# Patient Record
Sex: FEMALE | Race: WHITE | NOT HISPANIC OR LATINO | Employment: UNEMPLOYED | URBAN - METROPOLITAN AREA
[De-identification: names, ages, dates, MRNs, and addresses within clinical notes are randomized per-mention and may not be internally consistent; named-entity substitution may affect disease eponyms.]

---

## 2017-08-17 ENCOUNTER — HOSPITAL ENCOUNTER (EMERGENCY)
Facility: HOSPITAL | Age: 52
Discharge: HOME | End: 2017-08-17
Attending: EMERGENCY MEDICINE
Payer: MEDICAID

## 2017-08-17 VITALS
WEIGHT: 159 LBS | RESPIRATION RATE: 18 BRPM | DIASTOLIC BLOOD PRESSURE: 85 MMHG | BODY MASS INDEX: 25.55 KG/M2 | HEART RATE: 91 BPM | OXYGEN SATURATION: 98 % | HEIGHT: 66 IN | TEMPERATURE: 99 F | SYSTOLIC BLOOD PRESSURE: 131 MMHG

## 2017-08-17 DIAGNOSIS — R07.89 CHEST TIGHTNESS: Primary | ICD-10-CM

## 2017-08-17 DIAGNOSIS — S16.1XXA NECK STRAIN, INITIAL ENCOUNTER: ICD-10-CM
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
## 2017-08-17 PROCEDURE — 99284 EMERGENCY DEPT VISIT MOD MDM: CPT

## 2017-08-17 PROCEDURE — 93010 ELECTROCARDIOGRAM REPORT: CPT | Mod: ,,, | Performed by: INTERNAL MEDICINE

## 2017-08-17 PROCEDURE — 93005 ELECTROCARDIOGRAM TRACING: CPT

## 2017-08-17 PROCEDURE — 25000003 PHARM REV CODE 250: Performed by: PHYSICIAN ASSISTANT

## 2017-08-17 RX ORDER — METHOCARBAMOL 500 MG/1
1000 TABLET, FILM COATED ORAL 3 TIMES DAILY
Qty: 12 TABLET | Refills: 0 | Status: SHIPPED | OUTPATIENT
Start: 2017-08-17 | End: 2017-08-19

## 2017-08-17 RX ORDER — ACETAMINOPHEN 325 MG/1
325 TABLET ORAL EVERY 6 HOURS PRN
Qty: 20 TABLET | Refills: 0 | Status: SHIPPED | OUTPATIENT
Start: 2017-08-17

## 2017-08-17 RX ORDER — LIDOCAINE 50 MG/G
1 PATCH TOPICAL DAILY
Qty: 6 PATCH | Refills: 0 | Status: SHIPPED | OUTPATIENT
Start: 2017-08-17

## 2017-08-17 RX ORDER — DIAZEPAM 2 MG/1
2 TABLET ORAL
Status: COMPLETED | OUTPATIENT
Start: 2017-08-17 | End: 2017-08-17

## 2017-08-17 RX ADMIN — DIAZEPAM 2 MG: 2 TABLET ORAL at 04:08
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
## 2024-07-24 ENCOUNTER — HOSPITAL ENCOUNTER (EMERGENCY)
Facility: HOSPITAL | Age: 59
Discharge: HOME | End: 2024-07-24
Attending: EMERGENCY MEDICINE
Payer: MEDICAID

## 2024-07-24 VITALS
BODY MASS INDEX: 25.33 KG/M2 | DIASTOLIC BLOOD PRESSURE: 66 MMHG | SYSTOLIC BLOOD PRESSURE: 122 MMHG | HEIGHT: 65 IN | RESPIRATION RATE: 18 BRPM | OXYGEN SATURATION: 95 % | HEART RATE: 56 BPM | WEIGHT: 152 LBS | TEMPERATURE: 99 F

## 2024-07-24 DIAGNOSIS — R07.9 CHEST PAIN: ICD-10-CM

## 2024-07-24 DIAGNOSIS — R07.89 CHEST WALL PAIN: Primary | ICD-10-CM

## 2024-07-24 LAB
ALBUMIN SERPL BCP-MCNC: 4.1 G/DL (ref 3.5–5.2)
ALP SERPL-CCNC: 148 U/L (ref 55–135)
ALT SERPL W/O P-5'-P-CCNC: 19 U/L (ref 10–44)
ANION GAP SERPL CALC-SCNC: 11 MMOL/L (ref 8–16)
AST SERPL-CCNC: 19 U/L (ref 10–40)
BASOPHILS # BLD AUTO: 0.03 K/UL (ref 0–0.2)
BASOPHILS NFR BLD: 0.3 % (ref 0–1.9)
BILIRUB SERPL-MCNC: 0.5 MG/DL (ref 0.1–1)
BNP SERPL-MCNC: 87 PG/ML (ref 0–99)
BUN SERPL-MCNC: 11 MG/DL (ref 6–20)
CALCIUM SERPL-MCNC: 10 MG/DL (ref 8.7–10.5)
CHLORIDE SERPL-SCNC: 106 MMOL/L (ref 95–110)
CO2 SERPL-SCNC: 24 MMOL/L (ref 23–29)
CREAT SERPL-MCNC: 0.9 MG/DL (ref 0.5–1.4)
D DIMER PPP IA.FEU-MCNC: 1.01 MG/L FEU
DIFFERENTIAL METHOD BLD: ABNORMAL
EOSINOPHIL # BLD AUTO: 0.2 K/UL (ref 0–0.5)
EOSINOPHIL NFR BLD: 1.7 % (ref 0–8)
ERYTHROCYTE [DISTWIDTH] IN BLOOD BY AUTOMATED COUNT: 13.5 % (ref 11.5–14.5)
EST. GFR  (NO RACE VARIABLE): >60 ML/MIN/1.73 M^2
GLUCOSE SERPL-MCNC: 116 MG/DL (ref 70–110)
HCT VFR BLD AUTO: 44.4 % (ref 37–48.5)
HGB BLD-MCNC: 15.6 G/DL (ref 12–16)
IMM GRANULOCYTES # BLD AUTO: 0.04 K/UL (ref 0–0.04)
IMM GRANULOCYTES NFR BLD AUTO: 0.4 % (ref 0–0.5)
LIPASE SERPL-CCNC: 26 U/L (ref 4–60)
LYMPHOCYTES # BLD AUTO: 2.3 K/UL (ref 1–4.8)
LYMPHOCYTES NFR BLD: 23.9 % (ref 18–48)
MCH RBC QN AUTO: 31.8 PG (ref 27–31)
MCHC RBC AUTO-ENTMCNC: 35.1 G/DL (ref 32–36)
MCV RBC AUTO: 90 FL (ref 82–98)
MONOCYTES # BLD AUTO: 0.6 K/UL (ref 0.3–1)
MONOCYTES NFR BLD: 5.8 % (ref 4–15)
NEUTROPHILS # BLD AUTO: 6.6 K/UL (ref 1.8–7.7)
NEUTROPHILS NFR BLD: 67.9 % (ref 38–73)
NRBC BLD-RTO: 0 /100 WBC
OHS QRS DURATION: 84 MS
OHS QTC CALCULATION: 496 MS
PLATELET # BLD AUTO: 172 K/UL (ref 150–450)
PMV BLD AUTO: 10 FL (ref 9.2–12.9)
POTASSIUM SERPL-SCNC: 4.4 MMOL/L (ref 3.5–5.1)
PROT SERPL-MCNC: 7.9 G/DL (ref 6–8.4)
RBC # BLD AUTO: 4.91 M/UL (ref 4–5.4)
SODIUM SERPL-SCNC: 141 MMOL/L (ref 136–145)
TROPONIN I SERPL DL<=0.01 NG/ML-MCNC: <0.006 NG/ML (ref 0–0.03)
TROPONIN I SERPL DL<=0.01 NG/ML-MCNC: <0.006 NG/ML (ref 0–0.03)
WBC # BLD AUTO: 9.78 K/UL (ref 3.9–12.7)

## 2024-07-24 PROCEDURE — 93005 ELECTROCARDIOGRAM TRACING: CPT

## 2024-07-24 PROCEDURE — 85379 FIBRIN DEGRADATION QUANT: CPT | Performed by: EMERGENCY MEDICINE

## 2024-07-24 PROCEDURE — 96374 THER/PROPH/DIAG INJ IV PUSH: CPT | Mod: 59

## 2024-07-24 PROCEDURE — 25500020 PHARM REV CODE 255: Performed by: EMERGENCY MEDICINE

## 2024-07-24 PROCEDURE — 84484 ASSAY OF TROPONIN QUANT: CPT | Mod: 91 | Performed by: EMERGENCY MEDICINE

## 2024-07-24 PROCEDURE — 80053 COMPREHEN METABOLIC PANEL: CPT | Performed by: EMERGENCY MEDICINE

## 2024-07-24 PROCEDURE — 63600175 PHARM REV CODE 636 W HCPCS: Performed by: EMERGENCY MEDICINE

## 2024-07-24 PROCEDURE — 96376 TX/PRO/DX INJ SAME DRUG ADON: CPT

## 2024-07-24 PROCEDURE — 83690 ASSAY OF LIPASE: CPT | Performed by: EMERGENCY MEDICINE

## 2024-07-24 PROCEDURE — 96375 TX/PRO/DX INJ NEW DRUG ADDON: CPT | Mod: 59

## 2024-07-24 PROCEDURE — 93010 ELECTROCARDIOGRAM REPORT: CPT | Mod: ,,, | Performed by: INTERNAL MEDICINE

## 2024-07-24 PROCEDURE — 83880 ASSAY OF NATRIURETIC PEPTIDE: CPT | Performed by: EMERGENCY MEDICINE

## 2024-07-24 PROCEDURE — 25000003 PHARM REV CODE 250: Performed by: EMERGENCY MEDICINE

## 2024-07-24 PROCEDURE — 85025 COMPLETE CBC W/AUTO DIFF WBC: CPT | Performed by: EMERGENCY MEDICINE

## 2024-07-24 PROCEDURE — 99285 EMERGENCY DEPT VISIT HI MDM: CPT | Mod: 25

## 2024-07-24 RX ORDER — LIDOCAINE 50 MG/G
1 PATCH TOPICAL ONCE
Status: DISCONTINUED | OUTPATIENT
Start: 2024-07-24 | End: 2024-07-24 | Stop reason: HOSPADM

## 2024-07-24 RX ORDER — KETOROLAC TROMETHAMINE 30 MG/ML
15 INJECTION, SOLUTION INTRAMUSCULAR; INTRAVENOUS
Status: COMPLETED | OUTPATIENT
Start: 2024-07-24 | End: 2024-07-24

## 2024-07-24 RX ORDER — METHOCARBAMOL 500 MG/1
1000 TABLET, FILM COATED ORAL 3 TIMES DAILY
Qty: 30 TABLET | Refills: 0 | Status: SHIPPED | OUTPATIENT
Start: 2024-07-24 | End: 2024-07-29

## 2024-07-24 RX ORDER — MORPHINE SULFATE 4 MG/ML
4 INJECTION, SOLUTION INTRAMUSCULAR; INTRAVENOUS
Status: COMPLETED | OUTPATIENT
Start: 2024-07-24 | End: 2024-07-24

## 2024-07-24 RX ORDER — KETOROLAC TROMETHAMINE 10 MG/1
10 TABLET, FILM COATED ORAL EVERY 6 HOURS
Qty: 20 TABLET | Refills: 0 | Status: SHIPPED | OUTPATIENT
Start: 2024-07-24 | End: 2024-07-29

## 2024-07-24 RX ORDER — ONDANSETRON HYDROCHLORIDE 2 MG/ML
4 INJECTION, SOLUTION INTRAVENOUS
Status: COMPLETED | OUTPATIENT
Start: 2024-07-24 | End: 2024-07-24

## 2024-07-24 RX ORDER — LIDOCAINE 50 MG/G
1 PATCH TOPICAL DAILY
Qty: 15 PATCH | Refills: 0 | Status: SHIPPED | OUTPATIENT
Start: 2024-07-24

## 2024-07-24 RX ADMIN — ONDANSETRON 4 MG: 2 INJECTION INTRAMUSCULAR; INTRAVENOUS at 08:07

## 2024-07-24 RX ADMIN — LIDOCAINE 1 PATCH: 700 PATCH TOPICAL at 02:07

## 2024-07-24 RX ADMIN — KETOROLAC TROMETHAMINE 15 MG: 30 INJECTION, SOLUTION INTRAMUSCULAR at 02:07

## 2024-07-24 RX ADMIN — MORPHINE SULFATE 4 MG: 4 INJECTION, SOLUTION INTRAMUSCULAR; INTRAVENOUS at 11:07

## 2024-07-24 RX ADMIN — MORPHINE SULFATE 4 MG: 4 INJECTION, SOLUTION INTRAMUSCULAR; INTRAVENOUS at 08:07

## 2024-07-24 RX ADMIN — IOHEXOL 75 ML: 350 INJECTION, SOLUTION INTRAVENOUS at 12:07

## 2024-07-24 NOTE — ED NOTES
Pt presented w/ c/o pain under her right breast x 1 wk, worse w/ breathing and movement.  ED work up in progress.  Pt is AAOx3, resp even and unlabored, skin warm and dry.  HOB elevated, SR up x 2, call bell within reach. Chest pain now 6/10, denies nausea. NAD noted. Awaiting CTA.

## 2024-07-24 NOTE — ED PROVIDER NOTES
"Encounter Date: 7/24/2024    SCRIBE #1 NOTE: I, Lyn Pace, am scribing for, and in the presence of,  Joni Link MD. I have scribed the following portions of the note - Other sections scribed: HPI, ROS.       History     Chief Complaint   Patient presents with    Chest Pain     Pt reports pain to right chest and right "ribs" when taking a deep breath x1 week. Pt denies shortness of breath, cough, congestion, or fevers.      59 y.o. female, with a PMHx of HTN, emphysema, who presents to the ED with right lower chest wall pain for 1 week. Patient reports the pain is worse with breathing and movement, stating it wraps around her left flank into her back. Denies any known falls or trauma. No other exacerbating or alleviating factors.     The history is provided by the patient. No  was used.     Review of patient's allergies indicates:   Allergen Reactions    Penicillins Swelling     Past Medical History:   Diagnosis Date    Anxiety     Bipolar 1 disorder with moderate cassie     Depression     Hypertension      Past Surgical History:   Procedure Laterality Date    HYSTERECTOMY      KNEE SURGERY       No family history on file.  Social History     Tobacco Use    Smoking status: Former     Current packs/day: 1.50     Average packs/day: 1.5 packs/day for 8.0 years (12.0 ttl pk-yrs)     Types: Cigarettes    Smokeless tobacco: Never   Substance Use Topics    Alcohol use: No     Alcohol/week: 0.0 standard drinks of alcohol    Drug use: No     Review of Systems   Constitutional: Negative.    HENT: Negative.     Eyes: Negative.    Respiratory: Negative.     Cardiovascular:  Positive for chest pain (right chest wall).   Gastrointestinal: Negative.    Endocrine: Negative.    Genitourinary:  Positive for flank pain (R).   Musculoskeletal:  Positive for back pain.   Skin: Negative.    Allergic/Immunologic: Negative.    Neurological: Negative.    Hematological: Negative.        Physical Exam "     Initial Vitals [07/24/24 0724]   BP Pulse Resp Temp SpO2   (!) 143/63 63 18 97.3 °F (36.3 °C) 97 %      MAP       --         Physical Exam    Nursing note and vitals reviewed.  Constitutional: She appears well-developed. She is not diaphoretic. She appears distressed (mildly).   HENT:   Head: Normocephalic and atraumatic.   Nose: Nose normal.   Eyes: EOM are normal. Pupils are equal, round, and reactive to light.   Neck: Neck supple. No JVD present.   Normal range of motion.  Cardiovascular:  Normal rate, regular rhythm, normal heart sounds and intact distal pulses.           Pulmonary/Chest: Breath sounds normal. No stridor. No respiratory distress. She has no wheezes. She has no rhonchi. She has no rales. She exhibits tenderness (right-sided chest wall tenderness, no overlying skin changes noted).   Abdominal: Abdomen is soft. Bowel sounds are normal. She exhibits no distension. There is abdominal tenderness (mild RUQ ttp). There is no rebound and no guarding.   Musculoskeletal:         General: No tenderness or edema. Normal range of motion.      Cervical back: Normal range of motion and neck supple.     Neurological: She is alert and oriented to person, place, and time. She has normal strength.   Skin: Skin is warm and dry. Capillary refill takes less than 2 seconds. No rash noted. No erythema.         ED Course   Procedures  Labs Reviewed   CBC W/ AUTO DIFFERENTIAL - Abnormal       Result Value    WBC 9.78      RBC 4.91      Hemoglobin 15.6      Hematocrit 44.4      MCV 90      MCH 31.8 (*)     MCHC 35.1      RDW 13.5      Platelets 172      MPV 10.0      Immature Granulocytes 0.4      Gran # (ANC) 6.6      Immature Grans (Abs) 0.04      Lymph # 2.3      Mono # 0.6      Eos # 0.2      Baso # 0.03      nRBC 0      Gran % 67.9      Lymph % 23.9      Mono % 5.8      Eosinophil % 1.7      Basophil % 0.3      Differential Method Automated     COMPREHENSIVE METABOLIC PANEL - Abnormal    Sodium 141      Potassium  4.4      Chloride 106      CO2 24      Glucose 116 (*)     BUN 11      Creatinine 0.9      Calcium 10.0      Total Protein 7.9      Albumin 4.1      Total Bilirubin 0.5      Alkaline Phosphatase 148 (*)     AST 19      ALT 19      eGFR >60      Anion Gap 11     D DIMER, QUANTITATIVE - Abnormal    D-Dimer 1.01 (*)    TROPONIN I    Troponin I <0.006     B-TYPE NATRIURETIC PEPTIDE    BNP 87     LIPASE   LIPASE    Lipase 26     TROPONIN I    Troponin I <0.006          ECG Results              EKG 12-lead (Final result)        Collection Time Result Time QRS Duration OHS QTC Calculation    07/24/24 07:33:26 07/24/24 18:16:08 84 496                     Final result by Interface, Lab In [facility] (07/24/24 18:16:18)                   Narrative:    Test Reason : R07.9,    Vent. Rate : 091 BPM     Atrial Rate : 091 BPM     P-R Int : 150 ms          QRS Dur : 084 ms      QT Int : 404 ms       P-R-T Axes : 062 019 063 degrees     QTc Int : 496 ms    Sinus rhythm with frequent Premature ventricular complexes in a pattern of  bigeminy  Low voltage QRS  Borderline Abnormal ECG  When compared with ECG of 17-AUG-2017 16:47,  No significant change was found  Confirmed by Lloyd ÁLVAREZ, Arthur BERNARDO ([id number]) on 7/24/2024 6:16:04 PM    Referred By: [email or web address]   SELF           Confirmed By:Arthur Desai MD                                  Imaging Results              CTA Chest Non-Coronary (PE Studies) (Final result)  Result time 07/24/24 13:32:55      Final result by Antonino Musa MD (07/24/24 13:32:55)                   Impression:      1. Examination considered diagnostic to the proximal segmental pulmonary arterial level without convincing evidence of acute pulmonary embolism.  2. No definite acute intrathoracic findings.  3. Nonspecific solid pulmonary nodules measuring below 6 mm.  For multiple solid nodules all <6 mm, Fleischner Society 2017 guidelines recommend no routine follow up for a low risk patient, or follow up with non-contrast  chest CT at 12 months after discovery in a high risk patient.  4. Additional details, as provided in the body of the report.      Electronically signed by: Antonino Musa  Date:    07/24/2024  Time:    13:32               Narrative:    EXAMINATION:  CTA CHEST NON CORONARY (PE STUDIES)    CLINICAL HISTORY:  Pulmonary embolism (PE) suspected, positive D-dimer;    TECHNIQUE:  Low dose axial images, sagittal and coronal reformations were obtained from the thoracic inlet to the lung bases following the IV administration of 75 mL of Omnipaque 350.  Contrast timing was optimized to evaluate the pulmonary arteries.  MIP images were performed.    COMPARISON:  Chest radiograph 07/24/2024.    FINDINGS:  Exam quality: Examination considered diagnostic to the proximal segmental pulmonary arterial level.    Pulmonary embolism: No acute or chronic pulmonary embolism.    Central pulmonary arteries: Normal caliber.    Aorta: Nonaneurysmal.  Left-sided arch.  Conventional 3 vessel arch anatomy.  Moderate atherosclerotic calcification.    Heart: No right heart strain. Normal size.    Coronary arteries: No calcifications.    Pericardium: Normal. No effusion, thickening, or calcification.    Support tubes and lines: None.    Base of neck/thyroid: Normal.    Lymph nodes: No supraclavicular, axillary, internal mammary, mediastinal, or hilar adenopathy.    Esophagus: Normal.    Pleura: No effusion, thickening, or calcification.    Upper abdomen: Unremarkable    Body wall: Unremarkable    Airways: Central airways grossly patent.    Lungs: Assessment limited by motion.  Centrilobular predominant emphysema.  Dependent atelectasis.  Scattered areas of scarring.  Irregular shaped solid nodule measuring the order 5 mm in the right middle lobe abutting the fissure, possibly lymph node.  4 mm solid nodule right lower lobe (series 3, image 241).    Bones: No acute abnormality.  Partially imaged sequela of ACDF.  Modest degenerative change in the  thoracic spine.                                       US Abdomen Limited (Final result)  Result time 07/24/24 10:07:12      Final result by Jone Amador MD (07/24/24 10:07:12)                   Impression:      No sonographic evidence of cholelithiasis or acute cholecystitis.    Limited evaluation of the liver suggests hepatic steatosis.      Electronically signed by: Jone Amador MD  Date:    07/24/2024  Time:    10:07               Narrative:    EXAMINATION:  US ABDOMEN LIMITED    CLINICAL HISTORY:  RUQ abdominal pain;    TECHNIQUE:  Limited ultrasound of the right upper quadrant of the abdomen focused on the biliary system was performed.    COMPARISON:  None    FINDINGS:  Gallbladder: No calculi, wall thickening, or pericholecystic fluid.  No sonographic Jamison's sign.    Biliary system: The common duct is not dilated, measuring 3 mm.  No intrahepatic ductal dilatation.    Pancreas: The visualized portions of pancreas appear normal.    Miscellaneous: No upper abdominal ascites.  Limited evaluation of the hepatic parenchyma suggests steatosis.                                       X-Ray Chest AP Portable (Final result)  Result time 07/24/24 09:02:57      Final result by Antonino Musa MD (07/24/24 09:02:57)                   Impression:      Nonspecific left basilar opacity could relate to atelectasis, aspiration, or pneumonia.      Electronically signed by: Antonino Musa  Date:    07/24/2024  Time:    09:02               Narrative:    EXAMINATION:  XR CHEST AP PORTABLE    CLINICAL HISTORY:  Chest Pain;    TECHNIQUE:  Single frontal view of the chest was performed.    COMPARISON:  Chest radiograph performed 08/17/2017.    FINDINGS:  Monitoring leads overlie the chest.  Cardiomediastinal contours appear grossly unchanged within normal limits.  Atherosclerosis of the aorta.  Nonspecific left basilar opacity.    No definite pneumothorax or large volume pleural effusion.    No acute findings in the  visualized abdomen.    Osseous and soft tissue structures appear without definite acute abnormality.                                       Medications   morphine injection 4 mg (4 mg Intravenous Given 7/24/24 0834)   ondansetron injection 4 mg (4 mg Intravenous Given 7/24/24 0833)   morphine injection 4 mg (4 mg Intravenous Given 7/24/24 1119)   iohexoL (OMNIPAQUE 350) injection 75 mL (75 mLs Intravenous Given 7/24/24 1257)   ketorolac injection 15 mg (15 mg Intravenous Given 7/24/24 1444)     Medical Decision Making  Amount and/or Complexity of Data Reviewed  Labs: ordered. Decision-making details documented in ED Course.  Radiology: ordered and independent interpretation performed. Decision-making details documented in ED Course.  ECG/medicine tests: ordered and independent interpretation performed. Decision-making details documented in ED Course.    Risk  Prescription drug management.      MDM:    59-year-old female with past medical history as noted above presenting with concerns for chest pain.  Differential Diagnosis includes:  Acute mi/ACS, arrhythmia, aortic dissection, PE, pneumothorax.  Physical exam as noted above.  ED workup notable for CBC white blood cell count 9.78, hemoglobin 15.6, CMP with BUN 11, creatinine 0.9, glucose 116, troponin negative/negative, BNP 87, D-dimer 1.01, lipase 26, CTA shows no evidence of PE, nonspecific solid pulmonary nodules for which she will have follow-up, ultrasound right upper quadrant shows no evidence of cholelithiasis or acute cholecystitis, chest x-ray shows nonspecific left basilar opacity, EKG as noted below.  Patient presentation appears consistent with right-sided chest wall pain, will treat as possible musculoskeletal etiology with tenderness in his reproducible worsening with movement.  She has no evidence of PE, pneumonia or any pulmonary etiology or cardiac etiology this point.  She appears well upon reassessment and will be stable for discharge home at this  point. Do not suspect any additional surgical or medical emergency. Discussed diagnosis and further treatment with patient, including f/u.  Return precautions given and all questions answered.  Patient in understanding of plan.  Pt discharged to home improved and stable.        Note was created using voice recognition software. Note may have occasional typographical or grammatical errors, garbled syntax, and other bizarre constructions that may not have been identified and edited despite good nilo initial review prior to signing.               Scribe Attestation:   Scribe #1: I performed the above scribed service and the documentation accurately describes the services I performed. I attest to the accuracy of the note.        ED Course as of 07/25/24 0833   Wed Jul 24, 2024   0928 EKG-sinus rhythm with frequent PVCs pattern of bigeminy rate of 91 beats per minute, low-voltage QRS,  MS, no STEMI. [BB]      ED Course User Index  [BB] Joni Link MD                           Clinical Impression:  Final diagnoses:  [R07.9] Chest pain  [R07.89] Chest wall pain (Primary)          ED Disposition Condition    Discharge Stable          ED Prescriptions       Medication Sig Dispense Start Date End Date Auth. Provider    ketorolac (TORADOL) 10 mg tablet Take 1 tablet (10 mg total) by mouth every 6 (six) hours. for 5 days 20 tablet 7/24/2024 7/29/2024 Joni Link MD    LIDOcaine (LIDODERM) 5 % Place 1 patch onto the skin once daily. Remove & Discard patch within 12 hours or as directed by MD 15 patch 7/24/2024 -- Joni Link MD    methocarbamoL (ROBAXIN) 500 MG Tab Take 2 tablets (1,000 mg total) by mouth 3 (three) times daily. for 5 days 30 tablet 7/24/2024 7/29/2024 Joni Link MD          Follow-up Information       Follow up With Specialties Details Why Contact Info    [facility] [place] - Emergency Dept Emergency Medicine Go to  If symptoms worsen [address] [facility] [id number] [phone]    [facility] -  Schedule an appointment as soon as possible for a visit   [address]  [facility] [address]  [phone]      Your Primary Care Physician  Schedule an appointment as soon as possible for a visit             I, Joni Link M.D., personally performed the services described in this documentation. All medical record entries made by the scribe were at my direction and in my presence.  I have reviewed the chart and agree that the record reflects my personal performance and is accurate and complete.      Joni Link MD  07/25/24 2997

## 2025-02-18 ENCOUNTER — HOSPITAL ENCOUNTER (EMERGENCY)
Facility: HOSPITAL | Age: 60
Discharge: HOME | End: 2025-02-18
Attending: STUDENT IN AN ORGANIZED HEALTH CARE EDUCATION/TRAINING PROGRAM
Payer: MEDICAID

## 2025-02-18 VITALS
OXYGEN SATURATION: 98 % | WEIGHT: 144 LBS | DIASTOLIC BLOOD PRESSURE: 66 MMHG | HEIGHT: 65 IN | RESPIRATION RATE: 25 BRPM | TEMPERATURE: 98 F | BODY MASS INDEX: 23.99 KG/M2 | HEART RATE: 70 BPM | SYSTOLIC BLOOD PRESSURE: 120 MMHG

## 2025-02-18 DIAGNOSIS — K52.9 COLITIS: ICD-10-CM

## 2025-02-18 DIAGNOSIS — M54.9 BACK PAIN, UNSPECIFIED BACK LOCATION, UNSPECIFIED BACK PAIN LATERALITY, UNSPECIFIED CHRONICITY: ICD-10-CM

## 2025-02-18 DIAGNOSIS — R16.1 SPLENIC MASS: ICD-10-CM

## 2025-02-18 DIAGNOSIS — R16.1 SPLENOMEGALY: ICD-10-CM

## 2025-02-18 DIAGNOSIS — R21 RASH: Primary | ICD-10-CM

## 2025-02-18 DIAGNOSIS — R16.2 HEPATOSPLENOMEGALY: ICD-10-CM

## 2025-02-18 DIAGNOSIS — R10.9 ABDOMINAL PAIN, UNSPECIFIED ABDOMINAL LOCATION: ICD-10-CM

## 2025-02-18 DIAGNOSIS — R00.0 TACHYCARDIA: ICD-10-CM

## 2025-02-18 DIAGNOSIS — E05.90 SUBCLINICAL HYPERTHYROIDISM: ICD-10-CM

## 2025-02-18 LAB
ALBUMIN SERPL BCP-MCNC: 3.7 G/DL (ref 3.5–5.2)
ALP SERPL-CCNC: 130 U/L (ref 40–150)
ALT SERPL W/O P-5'-P-CCNC: 11 U/L (ref 10–44)
AMPHET+METHAMPHET UR QL: NEGATIVE
ANION GAP SERPL CALC-SCNC: 11 MMOL/L (ref 8–16)
AST SERPL-CCNC: 15 U/L (ref 10–40)
BARBITURATES UR QL SCN>200 NG/ML: NEGATIVE
BASOPHILS # BLD AUTO: 0.02 K/UL (ref 0–0.2)
BASOPHILS NFR BLD: 0.2 % (ref 0–1.9)
BENZODIAZ UR QL SCN>200 NG/ML: NEGATIVE
BILIRUB SERPL-MCNC: 0.8 MG/DL (ref 0.1–1)
BILIRUB UR QL STRIP: NEGATIVE
BUN SERPL-MCNC: 17 MG/DL (ref 6–20)
BZE UR QL SCN: NEGATIVE
CALCIUM SERPL-MCNC: 9.6 MG/DL (ref 8.7–10.5)
CANNABINOIDS UR QL SCN: NEGATIVE
CHLORIDE SERPL-SCNC: 103 MMOL/L (ref 95–110)
CLARITY UR: CLEAR
CO2 SERPL-SCNC: 25 MMOL/L (ref 23–29)
COLOR UR: YELLOW
CREAT SERPL-MCNC: 1 MG/DL (ref 0.5–1.4)
CREAT UR-MCNC: 80.4 MG/DL (ref 15–325)
CTP QC/QA: YES
CTP QC/QA: YES
DIFFERENTIAL METHOD BLD: ABNORMAL
EOSINOPHIL # BLD AUTO: 0.4 K/UL (ref 0–0.5)
EOSINOPHIL NFR BLD: 4.7 % (ref 0–8)
ERYTHROCYTE [DISTWIDTH] IN BLOOD BY AUTOMATED COUNT: 12.9 % (ref 11.5–14.5)
EST. GFR  (NO RACE VARIABLE): >60 ML/MIN/1.73 M^2
GLUCOSE SERPL-MCNC: 134 MG/DL (ref 70–110)
GLUCOSE UR QL STRIP: NEGATIVE
HCT VFR BLD AUTO: 41.7 % (ref 37–48.5)
HETEROPH AB SERPL QL IA: NEGATIVE
HGB BLD-MCNC: 14.9 G/DL (ref 12–16)
HGB UR QL STRIP: NEGATIVE
HIV 1+2 AB+HIV1 P24 AG SERPL QL IA: NORMAL
HIV1+2 IGG SERPL QL IA.RAPID: NORMAL
IMM GRANULOCYTES # BLD AUTO: 0.04 K/UL (ref 0–0.04)
IMM GRANULOCYTES NFR BLD AUTO: 0.5 % (ref 0–0.5)
KETONES UR QL STRIP: NEGATIVE
LEUKOCYTE ESTERASE UR QL STRIP: NEGATIVE
LIPASE SERPL-CCNC: 15 U/L (ref 4–60)
LYMPHOCYTES # BLD AUTO: 1.1 K/UL (ref 1–4.8)
LYMPHOCYTES NFR BLD: 12.7 % (ref 18–48)
MAGNESIUM SERPL-MCNC: 2 MG/DL (ref 1.6–2.6)
MCH RBC QN AUTO: 31.3 PG (ref 27–31)
MCHC RBC AUTO-ENTMCNC: 35.7 G/DL (ref 32–36)
MCV RBC AUTO: 88 FL (ref 82–98)
METHADONE UR QL SCN>300 NG/ML: NEGATIVE
MONOCYTES # BLD AUTO: 0.5 K/UL (ref 0.3–1)
MONOCYTES NFR BLD: 6.3 % (ref 4–15)
NEUTROPHILS # BLD AUTO: 6.5 K/UL (ref 1.8–7.7)
NEUTROPHILS NFR BLD: 75.6 % (ref 38–73)
NITRITE UR QL STRIP: NEGATIVE
NRBC BLD-RTO: 0 /100 WBC
OHS QRS DURATION: 80 MS
OHS QTC CALCULATION: 469 MS
OPIATES UR QL SCN: NEGATIVE
PCP UR QL SCN>25 NG/ML: NEGATIVE
PH UR STRIP: 6 [PH] (ref 5–8)
PLATELET # BLD AUTO: 131 K/UL (ref 150–450)
PMV BLD AUTO: 10.4 FL (ref 9.2–12.9)
POC MOLECULAR INFLUENZA A AGN: NEGATIVE
POC MOLECULAR INFLUENZA B AGN: NEGATIVE
POTASSIUM SERPL-SCNC: 4.3 MMOL/L (ref 3.5–5.1)
PROT SERPL-MCNC: 8.3 G/DL (ref 6–8.4)
PROT UR QL STRIP: ABNORMAL
RBC # BLD AUTO: 4.76 M/UL (ref 4–5.4)
SARS-COV-2 RDRP RESP QL NAA+PROBE: NEGATIVE
SODIUM SERPL-SCNC: 139 MMOL/L (ref 136–145)
SP GR UR STRIP: >1.03 (ref 1–1.03)
T4 FREE SERPL-MCNC: 0.85 NG/DL (ref 0.71–1.51)
TOXICOLOGY INFORMATION: NORMAL
TREPONEMA PALLIDUM IGG+IGM AB [PRESENCE] IN SERUM OR PLASMA BY IMMUNOASSAY: REACTIVE
TROPONIN I SERPL DL<=0.01 NG/ML-MCNC: <0.006 NG/ML (ref 0–0.03)
TSH SERPL DL<=0.005 MIU/L-ACNC: 0.39 UIU/ML (ref 0.4–4)
URN SPEC COLLECT METH UR: ABNORMAL
UROBILINOGEN UR STRIP-ACNC: NEGATIVE EU/DL
WBC # BLD AUTO: 8.53 K/UL (ref 3.9–12.7)

## 2025-02-18 PROCEDURE — 96361 HYDRATE IV INFUSION ADD-ON: CPT

## 2025-02-18 PROCEDURE — 83735 ASSAY OF MAGNESIUM: CPT | Performed by: STUDENT IN AN ORGANIZED HEALTH CARE EDUCATION/TRAINING PROGRAM

## 2025-02-18 PROCEDURE — 63600175 PHARM REV CODE 636 W HCPCS: Performed by: EMERGENCY MEDICINE

## 2025-02-18 PROCEDURE — 80074 ACUTE HEPATITIS PANEL: CPT | Performed by: EMERGENCY MEDICINE

## 2025-02-18 PROCEDURE — 25500020 PHARM REV CODE 255: Performed by: EMERGENCY MEDICINE

## 2025-02-18 PROCEDURE — 25500020 PHARM REV CODE 255: Performed by: STUDENT IN AN ORGANIZED HEALTH CARE EDUCATION/TRAINING PROGRAM

## 2025-02-18 PROCEDURE — 83690 ASSAY OF LIPASE: CPT | Performed by: STUDENT IN AN ORGANIZED HEALTH CARE EDUCATION/TRAINING PROGRAM

## 2025-02-18 PROCEDURE — 96374 THER/PROPH/DIAG INJ IV PUSH: CPT

## 2025-02-18 PROCEDURE — 87389 HIV-1 AG W/HIV-1&-2 AB AG IA: CPT | Performed by: STUDENT IN AN ORGANIZED HEALTH CARE EDUCATION/TRAINING PROGRAM

## 2025-02-18 PROCEDURE — 87502 INFLUENZA DNA AMP PROBE: CPT

## 2025-02-18 PROCEDURE — 86308 HETEROPHILE ANTIBODY SCREEN: CPT | Performed by: STUDENT IN AN ORGANIZED HEALTH CARE EDUCATION/TRAINING PROGRAM

## 2025-02-18 PROCEDURE — 81003 URINALYSIS AUTO W/O SCOPE: CPT | Mod: 59 | Performed by: STUDENT IN AN ORGANIZED HEALTH CARE EDUCATION/TRAINING PROGRAM

## 2025-02-18 PROCEDURE — 86592 SYPHILIS TEST NON-TREP QUAL: CPT | Performed by: STUDENT IN AN ORGANIZED HEALTH CARE EDUCATION/TRAINING PROGRAM

## 2025-02-18 PROCEDURE — 96375 TX/PRO/DX INJ NEW DRUG ADDON: CPT

## 2025-02-18 PROCEDURE — 84484 ASSAY OF TROPONIN QUANT: CPT | Performed by: STUDENT IN AN ORGANIZED HEALTH CARE EDUCATION/TRAINING PROGRAM

## 2025-02-18 PROCEDURE — 86665 EPSTEIN-BARR CAPSID VCA: CPT | Performed by: EMERGENCY MEDICINE

## 2025-02-18 PROCEDURE — 85025 COMPLETE CBC W/AUTO DIFF WBC: CPT | Performed by: STUDENT IN AN ORGANIZED HEALTH CARE EDUCATION/TRAINING PROGRAM

## 2025-02-18 PROCEDURE — 93005 ELECTROCARDIOGRAM TRACING: CPT

## 2025-02-18 PROCEDURE — 86703 HIV-1/HIV-2 1 RESULT ANTBDY: CPT | Performed by: EMERGENCY MEDICINE

## 2025-02-18 PROCEDURE — 25000003 PHARM REV CODE 250: Performed by: EMERGENCY MEDICINE

## 2025-02-18 PROCEDURE — 96376 TX/PRO/DX INJ SAME DRUG ADON: CPT

## 2025-02-18 PROCEDURE — 99285 EMERGENCY DEPT VISIT HI MDM: CPT | Mod: 25

## 2025-02-18 PROCEDURE — 63600175 PHARM REV CODE 636 W HCPCS: Mod: JZ,TB | Performed by: STUDENT IN AN ORGANIZED HEALTH CARE EDUCATION/TRAINING PROGRAM

## 2025-02-18 PROCEDURE — 84439 ASSAY OF FREE THYROXINE: CPT | Performed by: STUDENT IN AN ORGANIZED HEALTH CARE EDUCATION/TRAINING PROGRAM

## 2025-02-18 PROCEDURE — 87040 BLOOD CULTURE FOR BACTERIA: CPT | Mod: 59 | Performed by: EMERGENCY MEDICINE

## 2025-02-18 PROCEDURE — A9585 GADOBUTROL INJECTION: HCPCS | Performed by: EMERGENCY MEDICINE

## 2025-02-18 PROCEDURE — 87635 SARS-COV-2 COVID-19 AMP PRB: CPT | Performed by: STUDENT IN AN ORGANIZED HEALTH CARE EDUCATION/TRAINING PROGRAM

## 2025-02-18 PROCEDURE — 86593 SYPHILIS TEST NON-TREP QUANT: CPT | Mod: 91 | Performed by: STUDENT IN AN ORGANIZED HEALTH CARE EDUCATION/TRAINING PROGRAM

## 2025-02-18 PROCEDURE — 84443 ASSAY THYROID STIM HORMONE: CPT | Performed by: STUDENT IN AN ORGANIZED HEALTH CARE EDUCATION/TRAINING PROGRAM

## 2025-02-18 PROCEDURE — 80053 COMPREHEN METABOLIC PANEL: CPT | Performed by: STUDENT IN AN ORGANIZED HEALTH CARE EDUCATION/TRAINING PROGRAM

## 2025-02-18 PROCEDURE — 86593 SYPHILIS TEST NON-TREP QUANT: CPT | Performed by: STUDENT IN AN ORGANIZED HEALTH CARE EDUCATION/TRAINING PROGRAM

## 2025-02-18 PROCEDURE — 25000003 PHARM REV CODE 250: Performed by: STUDENT IN AN ORGANIZED HEALTH CARE EDUCATION/TRAINING PROGRAM

## 2025-02-18 PROCEDURE — 80307 DRUG TEST PRSMV CHEM ANLYZR: CPT | Performed by: STUDENT IN AN ORGANIZED HEALTH CARE EDUCATION/TRAINING PROGRAM

## 2025-02-18 PROCEDURE — 87799 DETECT AGENT NOS DNA QUANT: CPT | Performed by: EMERGENCY MEDICINE

## 2025-02-18 RX ORDER — GADOBUTROL 604.72 MG/ML
10 INJECTION INTRAVENOUS
Status: COMPLETED | OUTPATIENT
Start: 2025-02-18 | End: 2025-02-18

## 2025-02-18 RX ORDER — HYDROXYZINE HYDROCHLORIDE 25 MG/1
25 TABLET, FILM COATED ORAL EVERY 6 HOURS PRN
Qty: 30 TABLET | Refills: 1 | Status: SHIPPED | OUTPATIENT
Start: 2025-02-18

## 2025-02-18 RX ORDER — DIPHENHYDRAMINE HYDROCHLORIDE 50 MG/ML
25 INJECTION INTRAMUSCULAR; INTRAVENOUS
Status: COMPLETED | OUTPATIENT
Start: 2025-02-18 | End: 2025-02-18

## 2025-02-18 RX ORDER — PREDNISONE 20 MG/1
40 TABLET ORAL
Status: COMPLETED | OUTPATIENT
Start: 2025-02-18 | End: 2025-02-18

## 2025-02-18 RX ORDER — DIPHENHYDRAMINE HCL 25 MG
50 CAPSULE ORAL EVERY 6 HOURS PRN
Qty: 40 CAPSULE | Refills: 2 | Status: SHIPPED | OUTPATIENT
Start: 2025-02-18

## 2025-02-18 RX ORDER — LORAZEPAM 2 MG/ML
1 INJECTION INTRAMUSCULAR
Status: COMPLETED | OUTPATIENT
Start: 2025-02-18 | End: 2025-02-18

## 2025-02-18 RX ORDER — DIPHENHYDRAMINE HYDROCHLORIDE 50 MG/ML
50 INJECTION INTRAMUSCULAR; INTRAVENOUS
Status: COMPLETED | OUTPATIENT
Start: 2025-02-18 | End: 2025-02-18

## 2025-02-18 RX ORDER — KETOROLAC TROMETHAMINE 30 MG/ML
15 INJECTION, SOLUTION INTRAMUSCULAR; INTRAVENOUS
Status: COMPLETED | OUTPATIENT
Start: 2025-02-18 | End: 2025-02-18

## 2025-02-18 RX ORDER — ACETAMINOPHEN 500 MG
1000 TABLET ORAL
Status: COMPLETED | OUTPATIENT
Start: 2025-02-18 | End: 2025-02-18

## 2025-02-18 RX ADMIN — LORAZEPAM 1 MG: 2 INJECTION INTRAMUSCULAR; INTRAVENOUS at 09:02

## 2025-02-18 RX ADMIN — ACETAMINOPHEN 1000 MG: 500 TABLET ORAL at 01:02

## 2025-02-18 RX ADMIN — KETOROLAC TROMETHAMINE 15 MG: 30 INJECTION, SOLUTION INTRAMUSCULAR; INTRAVENOUS at 01:02

## 2025-02-18 RX ADMIN — PREDNISONE 40 MG: 20 TABLET ORAL at 01:02

## 2025-02-18 RX ADMIN — SODIUM CHLORIDE 1000 ML: 9 INJECTION, SOLUTION INTRAVENOUS at 08:02

## 2025-02-18 RX ADMIN — SODIUM CHLORIDE, POTASSIUM CHLORIDE, SODIUM LACTATE AND CALCIUM CHLORIDE 1000 ML: 600; 310; 30; 20 INJECTION, SOLUTION INTRAVENOUS at 01:02

## 2025-02-18 RX ADMIN — DIPHENHYDRAMINE HYDROCHLORIDE 50 MG: 50 INJECTION INTRAMUSCULAR; INTRAVENOUS at 12:02

## 2025-02-18 RX ADMIN — DIPHENHYDRAMINE HYDROCHLORIDE 25 MG: 50 INJECTION INTRAMUSCULAR; INTRAVENOUS at 01:02

## 2025-02-18 RX ADMIN — GADOBUTROL 10 ML: 604.72 INJECTION INTRAVENOUS at 01:02

## 2025-02-18 RX ADMIN — IOHEXOL 70 ML: 350 INJECTION, SOLUTION INTRAVENOUS at 02:02

## 2025-02-18 NOTE — ED PROVIDER NOTES
Encounter Date: 2/18/2025    SCRIBE #1 NOTE: I, Silke Adan, am scribing for, and in the presence of,  Yoon Mcfarland MD. I have scribed the following portions of the note - Other sections scribed: HPI, ROS, PE.       History     Chief Complaint   Patient presents with    Rash     Itchy bumps on BLE, BUE, back, and torso. Attempted benadryl and hydrocortisone cream w/o relief. No change in household products.    Fatigue     Fatigue and lightheadedness x 3 days.      Patient is a 60 y.o. female, with a PMHx of anxiety, chronic back pain, bipolar disorder and HTN, who presents to the ED with Rash and fatigue ongoing for 3 days. Patient states rash started on her back and has spread to her abdomen, arms, legs and buttocks. Patient reports lower back pain, itching, SOB, chest pain and chills. Patient stats that she has been seen by doctor for back pain but is unable to undergo surgery due to social factors. She also notes a fall 1 week prior. Patient states back pain is exacerbated by walking and lifting.  Patient reports chronic urinary incontinence with coughing.  Denies mouth sores, visual visual disturbances, fever or other associated symptoms. Patient has attempted treatment with benadryl, lidocaine, and inhaler with minimal relief.     The history is provided by the patient. No  was used.     Review of patient's allergies indicates:   Allergen Reactions    Penicillins Swelling     Past Medical History:   Diagnosis Date    Anxiety     Bipolar 1 disorder with moderate cassie     Depression     Hypertension      Past Surgical History:   Procedure Laterality Date    HYSTERECTOMY      KNEE SURGERY       No family history on file.  Social History[1]  Review of Systems   Constitutional:  Positive for chills and fatigue. Negative for fever. Unexpected weight change: from 180lbs to 140lbs.  HENT:  Negative for mouth sores.    Eyes:  Negative for visual disturbance.   Respiratory:  Positive for  shortness of breath.    Cardiovascular:  Positive for chest pain.   Gastrointestinal:  Negative for abdominal pain and vomiting.   Genitourinary:  Positive for enuresis.   Musculoskeletal:  Positive for back pain.   Skin:  Positive for rash.       Physical Exam     Initial Vitals [02/18/25 0037]   BP Pulse Resp Temp SpO2   (S) (!) 102/55 (!) 121 19 97.6 °F (36.4 °C) 97 %      MAP       --         Physical Exam    Vitals reviewed.  Constitutional: No distress.   Anxious appearing, tearful   HENT:   Head: Normocephalic and atraumatic.   Eyes: EOM are normal.   Neck:   Normal range of motion.  Cardiovascular:  Normal rate, regular rhythm and intact distal pulses.           Pulmonary/Chest: Breath sounds normal. No respiratory distress.   Abdominal: Abdomen is soft. There is abdominal tenderness (Diffuse, mild tenderness).   Musculoskeletal:         General: No edema. Normal range of motion.      Cervical back: Normal range of motion.     Neurological: She is alert and oriented to person, place, and time. She has normal strength. No cranial nerve deficit.   No focal neurologic deficits   Skin: Skin is warm. Rash (Diffuse erythematous, maculopapular rash to torso, back, all 4 extremities) noted.         ED Course   Procedures  Labs Reviewed   CBC W/ AUTO DIFFERENTIAL - Abnormal       Result Value    WBC 8.53      RBC 4.76      Hemoglobin 14.9      Hematocrit 41.7      MCV 88      MCH 31.3 (*)     MCHC 35.7      RDW 12.9      Platelets 131 (*)     MPV 10.4      Immature Granulocytes 0.5      Gran # (ANC) 6.5      Immature Grans (Abs) 0.04      Lymph # 1.1      Mono # 0.5      Eos # 0.4      Baso # 0.02      nRBC 0      Gran % 75.6 (*)     Lymph % 12.7 (*)     Mono % 6.3      Eosinophil % 4.7      Basophil % 0.2      Differential Method Automated      Narrative:     Release to patient->Immediate   COMPREHENSIVE METABOLIC PANEL - Abnormal    Sodium 139      Potassium 4.3      Chloride 103      CO2 25      Glucose 134 (*)      BUN 17      Creatinine 1.0      Calcium 9.6      Total Protein 8.3      Albumin 3.7      Total Bilirubin 0.8      Alkaline Phosphatase 130      AST 15      ALT 11      eGFR >60      Anion Gap 11      Narrative:     Release to patient->Immediate   TSH - Abnormal    TSH 0.390 (*)     Narrative:     Release to patient->Immediate   URINALYSIS, REFLEX TO URINE CULTURE - Abnormal    Specimen UA Urine, Clean Catch      Color, UA Yellow      Appearance, UA Clear      pH, UA 6.0      Specific Gravity, UA >1.030 (*)     Protein, UA Trace (*)     Glucose, UA Negative      Ketones, UA Negative      Bilirubin (UA) Negative      Occult Blood UA Negative      Nitrite, UA Negative      Urobilinogen, UA Negative      Leukocytes, UA Negative      Narrative:     Specimen Source->Urine   CULTURE, BLOOD    Blood Culture, Routine No Growth to date     CULTURE, BLOOD    Blood Culture, Routine No Growth to date     MAGNESIUM    Magnesium 2.0      Narrative:     Release to patient->Immediate   TROPONIN I    Troponin I <0.006      Narrative:     Release to patient->Immediate   DRUG SCREEN PANEL, URINE EMERGENCY    Benzodiazepines Negative      Methadone metabolites Negative      Cocaine (Metab.) Negative      Opiate Scrn, Ur Negative      Barbiturate Screen, Ur Negative      Amphetamine Screen, Ur Negative      THC Negative      Phencyclidine Negative      Creatinine, Urine 80.4      Toxicology Information SEE COMMENT      Narrative:     Specimen Source->Urine   LIPASE   T4, FREE    Free T4 0.85      Narrative:     Release to patient->Immediate   LIPASE    Lipase 15      Narrative:     Release to patient->Immediate   HETEROPHILE AB SCREEN   HETEROPHILE AB SCREEN    Monospot Negative      Narrative:     Release to patient->Immediate   RAPID HIV    HIV Rapid Testing Non-Reactive     TREPONEMA PALLIDIUM ANTIBODIES IGG, IGM   HIV 1 / 2 ANTIBODY   HEPATITIS PANEL, ACUTE   SARS-COV-2 RDRP GENE    POC Rapid COVID Negative        Acceptable Yes     POCT INFLUENZA A/B MOLECULAR    POC Molecular Influenza A Ag Negative      POC Molecular Influenza B Ag Negative       Acceptable Yes       EKG Readings: (Independently Interpreted)   Sinus rhythm, rate of 89, multiple PVCs, normal axis, normal intervals       Imaging Results              MRI Abdomen W WO Contrast (Final result)  Result time 02/18/25 13:37:07   Procedure changed from MRI Abdomen-Pelvis w w/o Contrast (XPD)     Final result by Keya Donnelly MD (02/18/25 13:37:07)                   Impression:      Tiny stones versus sludge in the gallbladder with no imaging evidence for acute cholecystitis.    Lesion in the spleen remains indeterminate.  Imaging features not typical of a hemangioma or splenic hamartoma.  Fibrous lesion could potentially give this MRI appearance though these are not typically hyperechoic on ultrasound.  Lesion can be followed with ultrasound to ensure size stability.      Electronically signed by: Keya Donnelly  Date:    02/18/2025  Time:    13:37               Narrative:    EXAMINATION:  MRI ABDOMEN W WO CONTRAST    CLINICAL HISTORY:  Occult malignancy;    TECHNIQUE:  Multisequence, multiplanar MRI of the abdomen performed per liver protocol before and after administration of 10 mL Gadavist intravenous contrast.    COMPARISON:  Right upper quadrant ultrasound 02/18/2025    FINDINGS:  Liver: Mild signal dropout on opposed phase imaging typical of hepatic steatosis.  No focal lesions.  Hepatic and portal veins are patent.    Biliary: Tiny stones or sludge layering dependently.  No gallbladder wall thickening or pericholecystic fluid.    Pancreas: Unremarkable.  No pancreatic ductal dilatation.    Spleen: Enlarged, 15.4 cm in length.  Focus of T2 signal hypointensity anteriorly estimated at 3 cm.  Lesion is fairly isointense with respect to spleen on precontrast T1 weighted sequences.  No abnormal restricted diffusion.  Following contrast, fairly  homogeneous enhancement on delayed imaging.    Adrenal glands: Unremarkable.    Kidneys: Thinly septated upper pole cyst on the right, 1.2 cm.    Miscellaneous: Visualized bowel loops are unremarkable.  No evidence for lymphadenopathy.                                       US Abdomen Limited (Final result)  Result time 02/18/25 07:24:01      Final result by Antonino Musa MD (02/18/25 07:24:01)                   Impression:      1. Biliary sludge without definite discrete shadowing gallstones or definite sonographic findings of acute cholecystitis at the present time.  2. Redemonstrated indeterminate splenic mass, as seen on 02/18/2025 CT.  While this could represent benign incidental lesion such as hemangioma, as suggested previously, further characterization with dedicated splenic mass protocol MRI would be recommended, given that this appears new when compared to prior imaging.  3. Additional details, as provided in the body of the report.      Electronically signed by: Antonino Musa  Date:    02/18/2025  Time:    07:24               Narrative:    EXAMINATION:  US ABDOMEN LIMITED    CLINICAL HISTORY:  Looking at spleen;    TECHNIQUE:  Limited ultrasound of the right upper quadrant of the abdomen (including pancreas, liver, gallbladder, common bile duct, and spleen) was performed.    COMPARISON:  None.    FINDINGS:  Liver: Enlarged, measuring 18.4 cm. Fatty liver infiltration. Suggested area of relative fatty sparing adjoining the gallbladder fossa measuring on the order of 3.3 x 1.6 x 2.4 cm.    Gallbladder: No discrete shadowing gallstones.  Minor dependent sludge.  No gallbladder wall thickening or hypervascularity.  No sonographic Jamison sign reported by the sonographer.    Biliary system: The common duct is not dilated, measuring 3.3 mm.  No intrahepatic ductal dilatation.    Spleen: Enlarged measuring 14.2 cm.  Focal echogenic lesion in the anterior spleen measuring on the order of 2.3 x 2.6 x 2.9 cm,  presumed corresponding to finding on 02/18/2025 CT.    Miscellaneous: No upper abdominal ascites.  Visualized portions of the pancreas grossly unremarkable.  No evidence of right-sided hydronephrosis.                                        CT Abdomen Pelvis With IV Contrast NO Oral Contrast (Final result)  Result time 02/18/25 03:51:38      Final result by Evan Wynn MD (02/18/25 03:51:38)                   Impression:      6 mm nodule right lung base. For a solid nodule 6-8 mm, Fleischner Society 2017 guidelines recommend follow up with non-contrast chest CT at 6-12 months and 18-24 months after discovery.    Hepatosplenomegaly.    3.3 cm mass in the anterior spleen, new from prior, incompletely characterized on this examination.  Suggest follow-up nonemergent MRI with splenic mass protocol.    Bilateral renal cortical scarring.    Long segment of colonic wall thickening involving the splenic flexure without associated significant pericolic fat stranding or diverticula.  Suspect a nonspecific colitis.    This report was flagged in Epic as abnormal.      Electronically signed by: Evan Wynn MD  Date:    02/18/2025  Time:    03:51               Narrative:    EXAMINATION:  CT ABDOMEN PELVIS WITH IV CONTRAST    CLINICAL HISTORY:  Abdominal pain, acute, nonlocalized;    TECHNIQUE:  Low dose axial images, sagittal and coronal reformations were obtained from the lung bases to the pubic symphysis following the IV administration of 70 mL of Omnipaque 350 .  Oral contrast was not administered.    COMPARISON:  07/20/2015.    FINDINGS:  Abdomen:    - Lower thorax:Unremarkable.    - Lung bases: 6 mm nodule right lung base.    - Liver: Hepatomegaly.  No focal mass.    - Gallbladder: No calcified gallstones.    - Bile Ducts: No evidence of intra or extra hepatic biliary ductal dilation.    - Spleen: Splenomegaly.  3.3 cm mass arising from the anterior aspect of the spleen, new from prior.    - Kidneys: Bilateral  renal cortical scarring.  No hydronephrosis.    - Adrenals: Unremarkable.    - Pancreas: No mass or peripancreatic fat stranding.    - Retroperitoneum:  No significant adenopathy.    - Vascular: No abdominal aortic aneurysm.    - Abdominal wall:  Unremarkable.    Pelvis:    No pelvic mass, adenopathy, or free fluid.    Bowel/Mesentery:    Small hiatal hernia.  No small bowel obstruction.  Long segment of colonic wall thickening involving the splenic flexure without significant surrounding fat stranding or diverticula.    Bones:  No acute osseous abnormality and no suspicious lytic or blastic lesion.                                       Medications   diphenhydrAMINE injection 25 mg (25 mg Intravenous Given 2/18/25 0138)   predniSONE tablet 40 mg (40 mg Oral Given 2/18/25 0125)   ketorolac injection 15 mg (15 mg Intravenous Given 2/18/25 0136)   acetaminophen tablet 1,000 mg (1,000 mg Oral Given 2/18/25 0125)   lactated ringers bolus 1,000 mL (0 mLs Intravenous Stopped 2/18/25 0428)   iohexoL (OMNIPAQUE 350) injection 70 mL (70 mLs Intravenous Given 2/18/25 0216)   sodium chloride 0.9% bolus 1,000 mL 1,000 mL (0 mLs Intravenous Stopped 2/18/25 1015)   LORazepam injection 1 mg (1 mg Intravenous Given 2/18/25 0933)   diphenhydrAMINE injection 50 mg (50 mg Intravenous Given 2/18/25 1225)   gadobutroL (GADAVIST) injection 10 mL (10 mLs Intravenous Given 2/18/25 1316)     Medical Decision Making  Allison Dietz is a 60 y.o. female with h/o anxiety, chronic back pain, bipolar disorder and HTN, who presents to the ED with Rash and fatigue ongoing for 3 days    Initial vitals notable for tachycardia. Physical exam reveals an anxious, tearful appearing woman with a diffuse, erythematous, pruritic and maculopapular rash that is more significant to her chest and back than her extremities.  She has diffuse back tenderness without midline point tenderness.  She has no focal neurologic deficits.  Patient has diffuse abdominal  tenderness without rebound or guarding.    Given exam and history, low suspicion for cord compression, cauda equina, epidural abscess/hematoma. Distally neurovascuarly intact. Patient has no history of malignancy, active or distant history.  Patient has no unexplained weight loss. No history of IVDU or skin-popping.  Patient does not have any history concerning for saddle anesthesia/perianal sensory loss or complaining of decreased rectal tone. Patient does not have urinary retention or new inability to control urine from overflow.  Patient has no point tenderness overlying spinous process. Patient has no focal weakness on examination.    Patient's rash concerning for viral exanthem versus drug eruption versus syphilis versus allergic reaction.    I will obtain the following to better assess:  CBC, CMP, TSH, Mag, trop, HIV, RPR, chest x-ray, EKG, CT a/P, COVID/flu test. Immediate interventions include Benadryl and oral steroids, Toradol and Tylenol.     DISCLAIMER: This note was prepared with [employer] voice recognition transcription software. Garbled syntax, mangled pronouns, and other bizarre constructions may be attributed to that software system.     Amount and/or Complexity of Data Reviewed  Labs: ordered. Decision-making details documented in ED Course.  Radiology: ordered.    Risk  OTC drugs.  Prescription drug management.               ED Course as of 02/18/25 1539   Tue Feb 18, 2025   0145 Patient's itching has resolved with medications [KI]   0156 Patient's tachycardia has resolved [KI]   0228 Initial labs reassuring [KI]   0259 Free T4: 0.85 [JM]   0434 Creatinine: 1.0 [JM]   0442 ALT: 11 [JM]   0442 AST: 15 [JM]   0443 Hemoglobin: 14.9 [JM]   0443 WBC: 8.53 [JM]   0502 Dr. Wynn, radiology     Discussed the splenic mass.  Discussed further imaging for better differentiation of the lesion.  Recommends ultrasound 1st.  Can consider MRI abdomen if ultrasound does not give the information needed.     [JM]    0539 Mono, Immunochomatopraphic IM Heterophile Antibody: Negative [JM]   0600 HIV Rapid Testing: Non-Reactive []   0707 Patient handoff from Dr. Mcfarland earlier.  New onset rash with abdominal pain on physical exam.  Pending CT imaging and labs.      Physical exam   Papular rash noted to the torso, upper extremities and distally to the upper thighs.  Does not involve the distal lower extremities including no feet or soles.  No involvement of the palms.  No involvement of the head including the mouth.  No abdominal tenderness on my exam.    MDM   Patient appears to have a 3.3 cm splenic mass.  Patient also has left-sided splenic colitis.  Patient denies any IVDU.  Denies any alcohol use.  Mild thrombocytopenia of 131.  Otherwise hemoglobin and WBC appropriate.  Differential diagnosis includes splenic malignancy, splenic abscess, splenic infarct, autoimmune vasculitis.  Case has been discussed with Radiology with recommendation for ultrasound for better differentiation of the splenic mass.  Referral placed to hematology for splenomegaly.  Monospot negative.  HIV negative.  Blood cultures obtained.     [JM]      ED Course User Index  [JM] Ric Quijano MD  [KI] Yoon Mcfarland MD          Medical decision making: Given the above, this patient presents emergency room with a rash.  She is discovered to have hepatosplenomegaly and a splenic mass.  She was study with a CT of the abdomen with IV contrast, an ultrasound of the spleen, and an MRI of the spleen.  The exact nature of the lesion was not clearly defined.  Consultation was obtained with the Hematology-Oncology.  Their recommendations were executed to the best of my ability.  The patients' mono spot was negative.  An acute hepatitis panel was ordered.  Infectious Disease was consulted for specific recommendations to study viral illnesses, Coxsackie virus, cytomegalovirus, Marbin bar virus.  The patient will be discharge to follow up with the  Infectious Disease, Dermatology, and Hematology-Oncology.  I will treat the patient with Benadryl and Atarax for itching.  CBC and chemistries are essentially unremarkable.  The lipase is negative.  The patient has a normal free T4 a negative troponin I negative flu drug screen COVID Monospot.  Studies are pending for syphilis.  The patient will not wait any longer for specific recommendations for infectious disease.             Scribe Note:  This is doctor Bashir dictating.  Though I was not present for the initial evaluation and did not composed the above note, I believe it is accurate.     Clinical Impression:  Final diagnoses:  [R00.0] Tachycardia  [R21] Rash (Primary)  [M54.9] Back pain, unspecified back location, unspecified back pain laterality, unspecified chronicity  [R10.9] Abdominal pain, unspecified abdominal location  [E05.90] Subclinical hyperthyroidism  [R16.1] Splenic mass  [K52.9] Colitis  [R16.1] Splenomegaly  [R16.2] Hepatosplenomegaly          ED Disposition Condition    Discharge Stable          ED Prescriptions       Medication Sig Dispense Start Date End Date Auth. Provider    diphenhydrAMINE (BENADRYL) 25 mg capsule Take 2 capsules (50 mg total) by mouth every 6 (six) hours as needed for Itching. 40 capsule 2/18/2025 -- Ric Camejo MD    hydrOXYzine HCL (ATARAX) 25 MG tablet Take 1 tablet (25 mg total) by mouth every 6 (six) hours as needed for Itching. 30 tablet 2/18/2025 -- Ric Camejo MD          Follow-up Information       Follow up With Specialties Details Why Contact Info    Padmini Hebert AU.D Audiology In 3 days  [address] [facility] [place] [address] [phone]      Usman Stewart MD Hematology and Oncology In 3 days  [address]  [address]  [place] [id number] [phone]      Erlinda Yoder MD Dermatology In 3 days  [address] [facility]  [address]  [place] [address] [phone]                   [1]   Social History  Tobacco Use    Smoking  status: Former     Current packs/day: 1.50     Average packs/day: 1.5 packs/day for 8.0 years (12.0 ttl pk-yrs)     Types: Cigarettes    Smokeless tobacco: Never   Substance Use Topics    Alcohol use: No     Alcohol/week: 0.0 standard drinks of alcohol    Drug use: No        Ric Camejo MD  02/18/25 1538

## 2025-02-18 NOTE — DISCHARGE INSTRUCTIONS
Benadryl and Atarax for itching.  Please return immediately if you get worse or if new problems develop.  Please follow up with the hematologist above, the infectious disease doctor above, in the dermatologist above.  So follow up with the primary care doctor.  Return immediately if you get worse or if new problems develop.

## 2025-02-18 NOTE — ED NOTES
Pt feels faint while changing from sitting to standing position. BP obtained in both positions:    Sitting 102/55   Sanding 132/65

## 2025-02-18 NOTE — ED TRIAGE NOTES
"Pt presents to ED d/t rash all over body and fatigue that began x3 days ago. Pt denies any recent change in detergent. Pt states, " I felt a crushing pain in my back 3 days ago and this also started". Pt reports 10/10 generalized back pain and itching all over.   "

## 2025-02-18 NOTE — CONSULTS
Hematology- Oncology Consult Note :     2/18/2025    Reason for consult: Rash/hepatosplenomegaly       HPI    Ptis a 60 y.o. female, with a PMHx of anxiety, chronic back pain, bipolar disorder and HTN, who presented to the  ED with Rash and fatigue for past 3 days. Patient states rash started on her back and has spread to her abdomen, arms, legs and buttocks. Pt has tried OTC anti itching meds without relief.  Imaging concerning for hepatosplenomegaly.  Oncology consulted for hepatosplenomegaly.       Patient Active Problem List    Diagnosis Date Noted    Chest tightness     Wound dehiscence 07/28/2015     Past Medical History:   Diagnosis Date    Anxiety     Bipolar 1 disorder with moderate cassie     Depression     Hypertension       Past Surgical History:   Procedure Laterality Date    HYSTERECTOMY      KNEE SURGERY        Prescriptions Prior to Admission[1]  Review of patient's allergies indicates:   Allergen Reactions    Penicillins Swelling      Social History     Tobacco Use    Smoking status: Former     Current packs/day: 1.50     Average packs/day: 1.5 packs/day for 8.0 years (12.0 ttl pk-yrs)     Types: Cigarettes    Smokeless tobacco: Never   Substance Use Topics    Alcohol use: No     Alcohol/week: 0.0 standard drinks of alcohol      No family history on file.     Review of Systems :  Review of Systems   Constitutional:  Positive for malaise/fatigue. Negative for fever.        Generalized rash and itching   HENT:  Negative for ear discharge and nosebleeds.    Eyes:  Negative for blurred vision.   Respiratory:  Negative for cough and shortness of breath.    Cardiovascular:  Negative for chest pain and leg swelling.   Gastrointestinal:  Negative for abdominal pain, blood in stool, constipation, diarrhea, heartburn, melena, nausea and vomiting.   Genitourinary:  Negative for dysuria and hematuria.   Musculoskeletal:  Positive for joint pain and myalgias.   Skin:  Negative for itching and rash.    Neurological:  Negative for dizziness.   Endo/Heme/Allergies:  Does not bruise/bleed easily.   Psychiatric/Behavioral:  Negative for depression. The patient is nervous/anxious.        Physical Exam :  Wt Readings from Last 3 Encounters:   02/18/25 65.3 kg (144 lb)   07/24/24 68.9 kg (152 lb)   08/17/17 72.1 kg (159 lb)     Temp Readings from Last 3 Encounters:   02/18/25 97.6 °F (36.4 °C) (Oral)   07/24/24 98.6 °F (37 °C) (Oral)   08/17/17 98.5 °F (36.9 °C)     BP Readings from Last 3 Encounters:   02/18/25 120/66   07/24/24 122/66   08/17/17 131/85     Pulse Readings from Last 3 Encounters:   02/18/25 70   07/24/24 (!) 56   08/17/17 91     Body mass index is 23.96 kg/m².    Physical Exam  Vitals reviewed.   HENT:      Head: Normocephalic and atraumatic.      Nose: Nose normal.      Mouth/Throat:      Mouth: Mucous membranes are moist.   Eyes:      Pupils: Pupils are equal, round, and reactive to light.   Cardiovascular:      Rate and Rhythm: Normal rate.   Pulmonary:      Effort: Pulmonary effort is normal.   Abdominal:      General: Abdomen is flat.   Musculoskeletal:         General: Normal range of motion.      Cervical back: Normal range of motion and neck supple.   Skin:     General: Skin is warm and dry.      Findings: Rash present.      Comments: Diffuse rash   Neurological:      Mental Status: She is alert and oriented to person, place, and time.   Psychiatric:         Mood and Affect: Mood normal.         Behavior: Behavior normal.           Pertinent Diagnostic studies:    Recent Results (from the past 24 hours)   CBC auto differential    Collection Time: 02/18/25  1:34 AM   Result Value Ref Range    WBC 8.53 3.90 - 12.70 K/uL    RBC 4.76 4.00 - 5.40 M/uL    Hemoglobin 14.9 12.0 - 16.0 g/dL    Hematocrit 41.7 37.0 - 48.5 %    MCV 88 82 - 98 fL    MCH 31.3 (H) 27.0 - 31.0 pg    MCHC 35.7 32.0 - 36.0 g/dL    RDW 12.9 11.5 - 14.5 %    Platelets 131 (L) 150 - 450 K/uL    MPV 10.4 9.2 - 12.9 fL    Immature  Granulocytes 0.5 0.0 - 0.5 %    Gran # (ANC) 6.5 1.8 - 7.7 K/uL    Immature Grans (Abs) 0.04 0.00 - 0.04 K/uL    Lymph # 1.1 1.0 - 4.8 K/uL    Mono # 0.5 0.3 - 1.0 K/uL    Eos # 0.4 0.0 - 0.5 K/uL    Baso # 0.02 0.00 - 0.20 K/uL    nRBC 0 0 /100 WBC    Gran % 75.6 (H) 38.0 - 73.0 %    Lymph % 12.7 (L) 18.0 - 48.0 %    Mono % 6.3 4.0 - 15.0 %    Eosinophil % 4.7 0.0 - 8.0 %    Basophil % 0.2 0.0 - 1.9 %    Differential Method Automated    Comprehensive metabolic panel    Collection Time: 02/18/25  1:34 AM   Result Value Ref Range    Sodium 139 136 - 145 mmol/L    Potassium 4.3 3.5 - 5.1 mmol/L    Chloride 103 95 - 110 mmol/L    CO2 25 23 - 29 mmol/L    Glucose 134 (H) 70 - 110 mg/dL    BUN 17 6 - 20 mg/dL    Creatinine 1.0 0.5 - 1.4 mg/dL    Calcium 9.6 8.7 - 10.5 mg/dL    Total Protein 8.3 6.0 - 8.4 g/dL    Albumin 3.7 3.5 - 5.2 g/dL    Total Bilirubin 0.8 0.1 - 1.0 mg/dL    Alkaline Phosphatase 130 40 - 150 U/L    AST 15 10 - 40 U/L    ALT 11 10 - 44 U/L    eGFR >60 >60 mL/min/1.73 m^2    Anion Gap 11 8 - 16 mmol/L   Magnesium    Collection Time: 02/18/25  1:34 AM   Result Value Ref Range    Magnesium 2.0 1.6 - 2.6 mg/dL   TSH    Collection Time: 02/18/25  1:34 AM   Result Value Ref Range    TSH 0.390 (L) 0.400 - 4.000 uIU/mL   Treponema Pallidium Antibodies IgG, IgM    Collection Time: 02/18/25  1:34 AM   Result Value Ref Range    Treponema Pallidum Antibodies (IgG, IgM) Reactive (A) Nonreactive   HIV 1/2 Ag/Ab (4th Gen)    Collection Time: 02/18/25  1:34 AM   Result Value Ref Range    HIV 1/2 Ag/Ab Non-reactive Non-reactive   Troponin I    Collection Time: 02/18/25  1:34 AM   Result Value Ref Range    Troponin I <0.006 0.000 - 0.026 ng/mL   T4, Free    Collection Time: 02/18/25  1:34 AM   Result Value Ref Range    Free T4 0.85 0.71 - 1.51 ng/dL   Lipase    Collection Time: 02/18/25  1:34 AM   Result Value Ref Range    Lipase 15 4 - 60 U/L   Heterophile Ab Screen    Collection Time: 02/18/25  1:34 AM   Result  Value Ref Range    Monospot Negative Negative   POCT COVID-19 Rapid Screening    Collection Time: 02/18/25  2:51 AM   Result Value Ref Range    POC Rapid COVID Negative Negative     Acceptable Yes    POCT Influenza A/B Molecular    Collection Time: 02/18/25  4:37 AM   Result Value Ref Range    POC Molecular Influenza A Ag Negative Negative    POC Molecular Influenza B Ag Negative Negative     Acceptable Yes    Rapid HIV    Collection Time: 02/18/25  5:07 AM   Result Value Ref Range    HIV Rapid Testing Non-Reactive Negative   Blood Culture #1 **CANNOT BE ORDERED STAT**    Collection Time: 02/18/25  5:07 AM    Specimen: Peripheral, Forearm, Right; Blood   Result Value Ref Range    Blood Culture, Routine No Growth to date    Blood Culture #2 **CANNOT BE ORDERED STAT**    Collection Time: 02/18/25  5:07 AM    Specimen: Peripheral, Forearm, Right; Blood   Result Value Ref Range    Blood Culture, Routine No Growth to date    Urinalysis, Reflex to Urine Culture Urine, Clean Catch    Collection Time: 02/18/25  5:11 AM    Specimen: Urine, Clean Catch   Result Value Ref Range    Specimen UA Urine, Clean Catch     Color, UA Yellow Yellow, Straw, Malena    Appearance, UA Clear Clear    pH, UA 6.0 5.0 - 8.0    Specific Gravity, UA >1.030 (A) 1.005 - 1.030    Protein, UA Trace (A) Negative    Glucose, UA Negative Negative    Ketones, UA Negative Negative    Bilirubin (UA) Negative Negative    Occult Blood UA Negative Negative    Nitrite, UA Negative Negative    Urobilinogen, UA Negative <2.0 EU/dL    Leukocytes, UA Negative Negative   Drug screen panel, emergency    Collection Time: 02/18/25  5:11 AM   Result Value Ref Range    Benzodiazepines Negative Negative    Methadone metabolites Negative Negative    Cocaine (Metab.) Negative Negative    Opiate Scrn, Ur Negative Negative    Barbiturate Screen, Ur Negative Negative    Amphetamine Screen, Ur Negative Negative    THC Negative Negative     Phencyclidine Negative Negative    Creatinine, Urine 80.4 15.0 - 325.0 mg/dL    Toxicology Information SEE COMMENT        Assessment/Plan :     Rash/Hepatosplenomegaly/Itching   -for past 3 days  -No prior hx per pt  -CT/MRI demonstrated indeterminate lesion in spleen and hepatosplenomegaly  -Differential is wide at this point but symptoms are concerning for a viral infection or possibly syphilis infection   -CBC largely wnl  -agree with labs and follow up results for syphilis, ebv antibody panel, ebc quant pcr, cmv quant pcr. if has hx of travel dengue, spotted fever ab, enterovirus pcr   -If viral workup unremarkable can consider skin lesion bx  -can follow spleen imaging with serial US outpt        Time spent on case: 45 minutes     Thank you for this consult, please contact us for any questions or concerns.    Usman Stewart MD   Hematology/oncology, [facility] [place]          [1] (Not in a hospital admission)

## 2025-02-19 LAB
HAV IGM SERPL QL IA: NORMAL
HBV CORE IGM SERPL QL IA: NORMAL
HBV SURFACE AG SERPL QL IA: NORMAL
HCV AB SERPL QL IA: NORMAL
RPR SER QL: REACTIVE
RPR SER-TITR: ABNORMAL {TITER}

## 2025-02-20 ENCOUNTER — HOSPITAL ENCOUNTER (EMERGENCY)
Facility: HOSPITAL | Age: 60
Discharge: HOME | End: 2025-02-20
Attending: EMERGENCY MEDICINE
Payer: MEDICAID

## 2025-02-20 ENCOUNTER — RESULTS FOLLOW-UP (OUTPATIENT)
Dept: EMERGENCY MEDICINE | Facility: HOSPITAL | Age: 60
End: 2025-02-20

## 2025-02-20 VITALS
WEIGHT: 144 LBS | DIASTOLIC BLOOD PRESSURE: 54 MMHG | BODY MASS INDEX: 23.99 KG/M2 | TEMPERATURE: 98 F | RESPIRATION RATE: 16 BRPM | HEART RATE: 100 BPM | SYSTOLIC BLOOD PRESSURE: 116 MMHG | HEIGHT: 65 IN | OXYGEN SATURATION: 100 %

## 2025-02-20 DIAGNOSIS — A53.9 SYPHILIS: Primary | ICD-10-CM

## 2025-02-20 LAB
CMV DNA SPEC QL NAA+PROBE: NORMAL
CYTOMEGALOVIRUS PCR, QUANT: NOT DETECTED IU/ML
EBV EA IGG SER QL IA: POSITIVE
EBV VCA IGG SER QL IA: POSITIVE
EBV VCA IGM SER QL IA: NEGATIVE
EPSTEIN-BARR VIRUS DNA: NORMAL
EPSTEIN-BARR VIRUS IGG, EARLY ANTIGEN: NEGATIVE
EPSTEIN-BARR VIRUS PCR, QUANT: NOT DETECTED IU/ML

## 2025-02-20 PROCEDURE — 99283 EMERGENCY DEPT VISIT LOW MDM: CPT

## 2025-02-20 RX ORDER — DOXYCYCLINE 100 MG/1
100 CAPSULE ORAL 2 TIMES DAILY
Qty: 20 CAPSULE | Refills: 0 | Status: SHIPPED | OUTPATIENT
Start: 2025-02-20 | End: 2025-03-06

## 2025-02-20 NOTE — DISCHARGE INSTRUCTIONS
You must follow-up with your primary care doctor/infectious disease to make sure you do not need further treatment.  They need to follow labs to resolution.    You need to follow up with other specialists as previously directed for evaluation of mass and ongoing management.
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
## 2025-02-22 LAB
BACTERIA BLD CULT: NORMAL
BACTERIA BLD CULT: NORMAL

## 2025-02-26 ENCOUNTER — TELEPHONE (OUTPATIENT)
Dept: HEMATOLOGY/ONCOLOGY | Facility: CLINIC | Age: 60
End: 2025-02-26
Payer: MEDICAID

## 2025-02-26 NOTE — TELEPHONE ENCOUNTER
TC to pt to schedule appt  No response and no VM to leave message on     TC to pt to schedule appt  No response and no VM to leave message on     Tc to home number  NOT in service    TC to pt  on cell phone  no response  NO VM     Letter mailed to pt on this date requesting she contact office

## 2025-03-03 ENCOUNTER — OFFICE VISIT (OUTPATIENT)
Dept: HEMATOLOGY/ONCOLOGY | Facility: CLINIC | Age: 60
End: 2025-03-03
Payer: MEDICAID

## 2025-03-03 VITALS
HEIGHT: 66 IN | SYSTOLIC BLOOD PRESSURE: 149 MMHG | BODY MASS INDEX: 23.53 KG/M2 | TEMPERATURE: 98 F | HEART RATE: 88 BPM | WEIGHT: 146.38 LBS | OXYGEN SATURATION: 98 % | DIASTOLIC BLOOD PRESSURE: 72 MMHG

## 2025-03-03 DIAGNOSIS — Z76.89 ENCOUNTER TO ESTABLISH CARE: ICD-10-CM

## 2025-03-03 DIAGNOSIS — R93.5 ABNORMAL FINDINGS ON DIAGNOSTIC IMAGING OF OTHER ABDOMINAL REGIONS, INCLUDING RETROPERITONEUM: ICD-10-CM

## 2025-03-03 DIAGNOSIS — R16.1 SPLENOMEGALY: Primary | ICD-10-CM

## 2025-03-03 PROCEDURE — 99215 OFFICE O/P EST HI 40 MIN: CPT | Mod: S$PBB,,, | Performed by: STUDENT IN AN ORGANIZED HEALTH CARE EDUCATION/TRAINING PROGRAM

## 2025-03-03 PROCEDURE — 99999 PR PBB SHADOW E&M-EST. PATIENT-LVL IV: CPT | Mod: PBBFAC,,, | Performed by: STUDENT IN AN ORGANIZED HEALTH CARE EDUCATION/TRAINING PROGRAM

## 2025-03-03 PROCEDURE — 3008F BODY MASS INDEX DOCD: CPT | Mod: CPTII,,, | Performed by: STUDENT IN AN ORGANIZED HEALTH CARE EDUCATION/TRAINING PROGRAM

## 2025-03-03 PROCEDURE — 3077F SYST BP >= 140 MM HG: CPT | Mod: CPTII,,, | Performed by: STUDENT IN AN ORGANIZED HEALTH CARE EDUCATION/TRAINING PROGRAM

## 2025-03-03 PROCEDURE — 3078F DIAST BP <80 MM HG: CPT | Mod: CPTII,,, | Performed by: STUDENT IN AN ORGANIZED HEALTH CARE EDUCATION/TRAINING PROGRAM

## 2025-03-03 PROCEDURE — 99214 OFFICE O/P EST MOD 30 MIN: CPT | Mod: PBBFAC | Performed by: STUDENT IN AN ORGANIZED HEALTH CARE EDUCATION/TRAINING PROGRAM

## 2025-03-03 PROCEDURE — 1159F MED LIST DOCD IN RCRD: CPT | Mod: CPTII,,, | Performed by: STUDENT IN AN ORGANIZED HEALTH CARE EDUCATION/TRAINING PROGRAM

## 2025-03-03 NOTE — PROGRESS NOTES
Hematology- Oncology Clinic Note :     3/3/2025    Chief Complaint   Patient presents with    New patient      New patient          HPI  Pt is a 60 y.o. female who  has a past medical history of Anxiety, Bipolar 1 disorder with moderate cassie, Depression, and Hypertension.  Who presents to establish care for splenomegaly/abnormal imaging that was seen on ED visit for rash.  Extensive testing in ED concerning for syphilis and pt placed on antibiotics.  Rash is improving and pt agreeable to US surveillance of spleen.  Referral placed to ID for follow up as abnormal spleen size most likely secondary to syphilis infection. No issues at time of exam apart from rash.  Pt claims compliance with antibiotics and rash improving.             Active Problem List with Overview Notes    Diagnosis Date Noted    Chest tightness     Wound dehiscence 07/28/2015       Patient Active Problem List    Diagnosis Date Noted    Chest tightness     Wound dehiscence 07/28/2015     Past Medical History:   Diagnosis Date    Anxiety     Bipolar 1 disorder with moderate cassie     Depression     Hypertension       Past Surgical History:   Procedure Laterality Date    HYSTERECTOMY      KNEE SURGERY        Prescriptions Prior to Admission[1]  Review of patient's allergies indicates:   Allergen Reactions    Penicillins Swelling      Social History     Tobacco Use    Smoking status: Every Day     Current packs/day: 1.50     Average packs/day: 1.5 packs/day for 8.0 years (12.0 ttl pk-yrs)     Types: Cigarettes     Passive exposure: Current    Smokeless tobacco: Current   Substance Use Topics    Alcohol use: No     Alcohol/week: 0.0 standard drinks of alcohol      No family history on file.     Review of Systems :  Review of Systems   Constitutional:  Negative for fever, malaise/fatigue and weight loss.   HENT:  Negative for congestion and nosebleeds.    Eyes:  Negative for blurred vision and discharge.   Respiratory:  Negative for cough and shortness  of breath.    Cardiovascular:  Negative for chest pain and leg swelling.   Gastrointestinal:  Negative for blood in stool, heartburn, nausea and vomiting.   Genitourinary:  Negative for dysuria.   Musculoskeletal:  Negative for joint pain and myalgias.   Skin:  Positive for rash. Negative for itching.   Neurological:  Positive for dizziness.   Endo/Heme/Allergies:  Does not bruise/bleed easily.   Psychiatric/Behavioral:  Negative for depression. The patient is nervous/anxious.        Physical Exam :  Wt Readings from Last 3 Encounters:   02/20/25 65.3 kg (144 lb)   02/18/25 65.3 kg (144 lb)   07/24/24 68.9 kg (152 lb)     Temp Readings from Last 3 Encounters:   02/20/25 98.1 °F (36.7 °C) (Oral)   02/18/25 97.6 °F (36.4 °C) (Oral)   07/24/24 98.6 °F (37 °C) (Oral)     BP Readings from Last 3 Encounters:   02/20/25 (!) 116/54   02/18/25 120/66   07/24/24 122/66     Pulse Readings from Last 3 Encounters:   02/20/25 100   02/18/25 70   07/24/24 (!) 56     There is no height or weight on file to calculate BMI.    Physical Exam  Vitals reviewed.   HENT:      Head: Normocephalic and atraumatic.      Nose: Nose normal.      Mouth/Throat:      Mouth: Mucous membranes are moist.   Eyes:      Pupils: Pupils are equal, round, and reactive to light.   Cardiovascular:      Rate and Rhythm: Normal rate and regular rhythm.   Pulmonary:      Effort: Pulmonary effort is normal.      Breath sounds: Normal breath sounds.   Abdominal:      General: Abdomen is flat.   Musculoskeletal:         General: Normal range of motion.      Cervical back: Normal range of motion and neck supple.   Skin:     General: Skin is warm and dry.      Findings: Rash present.   Neurological:      Mental Status: She is alert. Mental status is at baseline.   Psychiatric:         Mood and Affect: Mood normal.         Behavior: Behavior normal.           Pertinent Diagnostic studies:    No results found for this or any previous visit (from the past 24  hours).    Assessment/Plan :     Abnormal spleen imaging  -Lesion in the spleen remains indeterminate. Imaging features not typical of a hemangioma or splenic hamartoma. Fibrous lesion could potentially give this MRI appearance though these are not typically hyperechoic on ultrasound. Lesion can be followed with ultrasound to ensure size stability as seen on US and MRI in ED  -Most likely secondary to syphilis infection   -RTC in 4 months for MD visit and CBC and US abdomen week prior and continue to monitor       Syphilis infection  -Has ID referral and completing antibiotics  -Most likely cause of abnormal spleen imaging      Time spent on case: 40 minutes    Summary of orders placed this encounter:  No orders of the defined types were placed in this encounter.      Future Appointments   Date Time Provider Department Center   3/3/2025  9:00 AM Usman Stewart MD [facility] HEM ONC [facility]           Usman Stewart MD   Hematology/oncology, [facility]           [1] (Not in a hospital admission)

## 2025-06-25 ENCOUNTER — HOSPITAL ENCOUNTER (OUTPATIENT)
Dept: RADIOLOGY | Facility: HOSPITAL | Age: 60
Discharge: HOME | End: 2025-06-25
Attending: STUDENT IN AN ORGANIZED HEALTH CARE EDUCATION/TRAINING PROGRAM
Payer: MEDICAID

## 2025-06-25 DIAGNOSIS — R93.5 ABNORMAL FINDINGS ON DIAGNOSTIC IMAGING OF OTHER ABDOMINAL REGIONS, INCLUDING RETROPERITONEUM: ICD-10-CM

## 2025-06-25 DIAGNOSIS — R16.1 SPLENOMEGALY: ICD-10-CM

## 2025-06-25 PROCEDURE — 76705 ECHO EXAM OF ABDOMEN: CPT | Mod: 26,,, | Performed by: RADIOLOGY

## 2025-06-25 PROCEDURE — 76705 ECHO EXAM OF ABDOMEN: CPT | Mod: TC

## 2025-06-28 ENCOUNTER — HOSPITAL ENCOUNTER (EMERGENCY)
Facility: HOSPITAL | Age: 60
Discharge: HOME | End: 2025-06-29
Attending: EMERGENCY MEDICINE
Payer: MEDICAID

## 2025-06-28 DIAGNOSIS — J20.9 ACUTE BRONCHITIS, UNSPECIFIED ORGANISM: ICD-10-CM

## 2025-06-28 DIAGNOSIS — R10.9 ABDOMINAL WALL PAIN: ICD-10-CM

## 2025-06-28 DIAGNOSIS — R09.81 SINUS CONGESTION: ICD-10-CM

## 2025-06-28 DIAGNOSIS — R11.2 NAUSEA AND VOMITING, UNSPECIFIED VOMITING TYPE: ICD-10-CM

## 2025-06-28 DIAGNOSIS — R05.9 COUGH: ICD-10-CM

## 2025-06-28 DIAGNOSIS — R51.9 ACUTE NONINTRACTABLE HEADACHE, UNSPECIFIED HEADACHE TYPE: ICD-10-CM

## 2025-06-28 DIAGNOSIS — R16.1 SPLENOMEGALY: ICD-10-CM

## 2025-06-28 DIAGNOSIS — R10.10 UPPER ABDOMINAL PAIN: Primary | ICD-10-CM

## 2025-06-28 LAB
ABSOLUTE EOSINOPHIL (OHS): 0.23 K/UL
ABSOLUTE MONOCYTE (OHS): 0.67 K/UL (ref 0.3–1)
ABSOLUTE NEUTROPHIL COUNT (OHS): 7.45 K/UL (ref 1.8–7.7)
ALBUMIN SERPL BCP-MCNC: 3.7 G/DL (ref 3.5–5.2)
ALP SERPL-CCNC: 121 UNIT/L (ref 40–150)
ALT SERPL W/O P-5'-P-CCNC: 12 UNIT/L (ref 10–44)
ANION GAP (OHS): 11 MMOL/L (ref 8–16)
AST SERPL-CCNC: 17 UNIT/L (ref 11–45)
BASOPHILS # BLD AUTO: 0.03 K/UL
BASOPHILS NFR BLD AUTO: 0.3 %
BILIRUB SERPL-MCNC: 0.5 MG/DL (ref 0.1–1)
BILIRUB UR QL STRIP.AUTO: NEGATIVE
BUN SERPL-MCNC: 15 MG/DL (ref 6–20)
CALCIUM SERPL-MCNC: 9 MG/DL (ref 8.7–10.5)
CHLORIDE SERPL-SCNC: 105 MMOL/L (ref 95–110)
CLARITY UR: CLEAR
CO2 SERPL-SCNC: 25 MMOL/L (ref 23–29)
COLOR UR AUTO: YELLOW
CREAT SERPL-MCNC: 0.9 MG/DL (ref 0.5–1.4)
ERYTHROCYTE [DISTWIDTH] IN BLOOD BY AUTOMATED COUNT: 14 % (ref 11.5–14.5)
GFR SERPLBLD CREATININE-BSD FMLA CKD-EPI: >60 ML/MIN/1.73/M2
GLUCOSE SERPL-MCNC: 151 MG/DL (ref 70–110)
GLUCOSE UR QL STRIP: NEGATIVE
HCT VFR BLD AUTO: 39.8 % (ref 37–48.5)
HGB BLD-MCNC: 13.5 GM/DL (ref 12–16)
HGB UR QL STRIP: NEGATIVE
IMM GRANULOCYTES # BLD AUTO: 0.08 K/UL (ref 0–0.04)
IMM GRANULOCYTES NFR BLD AUTO: 0.8 % (ref 0–0.5)
KETONES UR QL STRIP: NEGATIVE
LEUKOCYTE ESTERASE UR QL STRIP: NEGATIVE
LIPASE SERPL-CCNC: 30 U/L (ref 4–60)
LYMPHOCYTES # BLD AUTO: 2.19 K/UL (ref 1–4.8)
MAGNESIUM SERPL-MCNC: 2 MG/DL (ref 1.6–2.6)
MCH RBC QN AUTO: 31.3 PG (ref 27–31)
MCHC RBC AUTO-ENTMCNC: 33.9 G/DL (ref 32–36)
MCV RBC AUTO: 92 FL (ref 82–98)
NITRITE UR QL STRIP: NEGATIVE
NUCLEATED RBC (/100WBC) (OHS): 0 /100 WBC
PH UR STRIP: 6 [PH]
PLATELET # BLD AUTO: 146 K/UL (ref 150–450)
PMV BLD AUTO: 9.6 FL (ref 9.2–12.9)
POTASSIUM SERPL-SCNC: 3.7 MMOL/L (ref 3.5–5.1)
PROT SERPL-MCNC: 8 GM/DL (ref 6–8.4)
PROT UR QL STRIP: NEGATIVE
RBC # BLD AUTO: 4.32 M/UL (ref 4–5.4)
RELATIVE EOSINOPHIL (OHS): 2.2 %
RELATIVE LYMPHOCYTE (OHS): 20.6 % (ref 18–48)
RELATIVE MONOCYTE (OHS): 6.3 % (ref 4–15)
RELATIVE NEUTROPHIL (OHS): 69.8 % (ref 38–73)
SODIUM SERPL-SCNC: 141 MMOL/L (ref 136–145)
SP GR UR STRIP: 1.02
TROPONIN I SERPL DL<=0.01 NG/ML-MCNC: <0.006 NG/ML
UROBILINOGEN UR STRIP-ACNC: NEGATIVE EU/DL
WBC # BLD AUTO: 10.65 K/UL (ref 3.9–12.7)

## 2025-06-28 PROCEDURE — 80053 COMPREHEN METABOLIC PANEL: CPT | Performed by: EMERGENCY MEDICINE

## 2025-06-28 PROCEDURE — 85025 COMPLETE CBC W/AUTO DIFF WBC: CPT | Performed by: EMERGENCY MEDICINE

## 2025-06-28 PROCEDURE — 83735 ASSAY OF MAGNESIUM: CPT | Performed by: EMERGENCY MEDICINE

## 2025-06-28 PROCEDURE — 81003 URINALYSIS AUTO W/O SCOPE: CPT | Performed by: EMERGENCY MEDICINE

## 2025-06-28 PROCEDURE — 86593 SYPHILIS TEST NON-TREP QUANT: CPT | Performed by: EMERGENCY MEDICINE

## 2025-06-28 PROCEDURE — 99285 EMERGENCY DEPT VISIT HI MDM: CPT | Mod: 25

## 2025-06-28 PROCEDURE — 84484 ASSAY OF TROPONIN QUANT: CPT | Performed by: EMERGENCY MEDICINE

## 2025-06-28 PROCEDURE — 86592 SYPHILIS TEST NON-TREP QUAL: CPT | Performed by: EMERGENCY MEDICINE

## 2025-06-28 PROCEDURE — 83690 ASSAY OF LIPASE: CPT | Performed by: EMERGENCY MEDICINE

## 2025-06-28 PROCEDURE — 93010 ELECTROCARDIOGRAM REPORT: CPT | Mod: ,,, | Performed by: INTERNAL MEDICINE

## 2025-06-28 PROCEDURE — 93005 ELECTROCARDIOGRAM TRACING: CPT

## 2025-06-29 VITALS
SYSTOLIC BLOOD PRESSURE: 122 MMHG | HEIGHT: 66 IN | DIASTOLIC BLOOD PRESSURE: 62 MMHG | TEMPERATURE: 98 F | HEART RATE: 93 BPM | OXYGEN SATURATION: 97 % | RESPIRATION RATE: 18 BRPM | BODY MASS INDEX: 25.71 KG/M2 | WEIGHT: 160 LBS

## 2025-06-29 LAB
CTP QC/QA: YES
CTP QC/QA: YES
HOLD SPECIMEN: NORMAL
POC MOLECULAR INFLUENZA A AGN: NEGATIVE
POC MOLECULAR INFLUENZA B AGN: NEGATIVE
SARS-COV-2 RDRP RESP QL NAA+PROBE: NEGATIVE

## 2025-06-29 PROCEDURE — 87635 SARS-COV-2 COVID-19 AMP PRB: CPT | Performed by: EMERGENCY MEDICINE

## 2025-06-29 PROCEDURE — 63600175 PHARM REV CODE 636 W HCPCS: Mod: JZ,TB | Performed by: EMERGENCY MEDICINE

## 2025-06-29 PROCEDURE — 87502 INFLUENZA DNA AMP PROBE: CPT

## 2025-06-29 PROCEDURE — 25500020 PHARM REV CODE 255: Performed by: EMERGENCY MEDICINE

## 2025-06-29 PROCEDURE — 96374 THER/PROPH/DIAG INJ IV PUSH: CPT

## 2025-06-29 RX ORDER — ALBUTEROL SULFATE 90 UG/1
2 INHALANT RESPIRATORY (INHALATION) EVERY 4 HOURS PRN
Qty: 18 G | Refills: 2 | Status: SHIPPED | OUTPATIENT
Start: 2025-06-29

## 2025-06-29 RX ORDER — ALBUTEROL SULFATE 90 UG/1
2 INHALANT RESPIRATORY (INHALATION) EVERY 4 HOURS PRN
COMMUNITY
End: 2025-06-29

## 2025-06-29 RX ORDER — CETIRIZINE HYDROCHLORIDE 10 MG/1
10 TABLET ORAL NIGHTLY
COMMUNITY
Start: 2025-03-18 | End: 2025-06-29

## 2025-06-29 RX ORDER — AZITHROMYCIN 250 MG/1
TABLET, FILM COATED ORAL
Qty: 6 TABLET | Refills: 0 | Status: SHIPPED | OUTPATIENT
Start: 2025-06-29

## 2025-06-29 RX ORDER — ONDANSETRON 4 MG/1
4 TABLET, ORALLY DISINTEGRATING ORAL EVERY 4 HOURS PRN
Qty: 20 TABLET | Refills: 0 | Status: SHIPPED | OUTPATIENT
Start: 2025-06-29

## 2025-06-29 RX ORDER — KETOROLAC TROMETHAMINE 30 MG/ML
30 INJECTION, SOLUTION INTRAMUSCULAR; INTRAVENOUS
Status: COMPLETED | OUTPATIENT
Start: 2025-06-29 | End: 2025-06-29

## 2025-06-29 RX ORDER — CETIRIZINE HYDROCHLORIDE 10 MG/1
10 TABLET ORAL NIGHTLY PRN
Qty: 30 TABLET | Refills: 2 | Status: SHIPPED | OUTPATIENT
Start: 2025-06-29

## 2025-06-29 RX ORDER — PROMETHAZINE HYDROCHLORIDE AND DEXTROMETHORPHAN HYDROBROMIDE 6.25; 15 MG/5ML; MG/5ML
5 SYRUP ORAL EVERY 4 HOURS PRN
Qty: 118 ML | Refills: 0 | Status: SHIPPED | OUTPATIENT
Start: 2025-06-29 | End: 2025-07-09

## 2025-06-29 RX ADMIN — KETOROLAC TROMETHAMINE 30 MG: 30 INJECTION, SOLUTION INTRAMUSCULAR; INTRAVENOUS at 12:06

## 2025-06-29 RX ADMIN — IOHEXOL 75 ML: 350 INJECTION, SOLUTION INTRAVENOUS at 12:06

## 2025-06-29 NOTE — ED PROVIDER NOTES
Encounter Date: 6/28/2025       History     Chief Complaint   Patient presents with    Abdominal Pain     Pt states enlarged liver and spleen, and states now she has pain to the area. Pt has not taken any medications for this. Pain is 8/10     [facility] 15    59 yo female presents via personal transportation (drove herself) to [facility] with left-sided abdominal pain.  Patient had ultrasound of her spleen 3 days ago (6/25/25).  She states that the pressure from that exam caused persistent 8/10 left-sided abdominal pain which is constant and radiates to her left mid-back.  No fevers/chills.  Patient reports nausea/vomiting as well.  Pain to abdomen is worse with certain positions or breathing; however patient has no chest pain or shortness of breath.    Patient also reports sinus congestion, headaches, and cough productive of green sputum since last week.  She notes that this started when she visited a friend in this hospital; friend was diagnosed with pneumonia.  Patient has run of rx'ed inhalers.      PMH: HTN, DLD  Psych: bipolar 1, anxiety, depression        Review of patient's allergies indicates:   Allergen Reactions    Penicillins Swelling     Past Medical History:   Diagnosis Date    Anxiety     Bipolar 1 disorder with moderate cassie     Depression     Hypertension      Past Surgical History:   Procedure Laterality Date    HYSTERECTOMY      KNEE SURGERY       No family history on file.  Social History[1]  Review of Systems   Gastrointestinal:  Positive for abdominal pain.       Physical Exam     Initial Vitals [06/28/25 2139]   BP Pulse Resp Temp SpO2   134/62 98 18 98 °F (36.7 °C) 99 %      MAP       --         Physical Exam    Nursing note and vitals reviewed.  Constitutional: She appears well-developed and well-nourished. She is not diaphoretic.   Awake, alert, uncomfortable.   HENT:   Head: Normocephalic and atraumatic.   Poor dentition   Eyes: Conjunctivae and EOM are normal. Pupils are equal,  round, and reactive to light.   Neck: Neck supple.   Normal range of motion.  Cardiovascular:  Normal rate, regular rhythm, normal heart sounds and intact distal pulses.           No murmur heard.  Pulmonary/Chest: Breath sounds normal. No respiratory distress. She has no wheezes. She has no rhonchi. She has no rales.   Abdominal: Abdomen is soft. There is abdominal tenderness (left upper quadrant). There is no rebound and no guarding.   Musculoskeletal:         General: No tenderness or edema. Normal range of motion.      Cervical back: Normal range of motion and neck supple.     Neurological: She is alert and oriented to person, place, and time.   Moving all extremities.   Skin: Skin is warm and dry. No erythema. No pallor.   Psychiatric: She has a normal mood and affect.         ED Course   Procedures  Labs Reviewed   COMPREHENSIVE METABOLIC PANEL - Abnormal       Result Value    Sodium 141      Potassium 3.7      Chloride 105      CO2 25      Glucose 151 (*)     BUN 15      Creatinine 0.9      Calcium 9.0      Protein Total 8.0      Albumin 3.7      Bilirubin Total 0.5            AST 17      ALT 12      Anion Gap 11      eGFR >60     CBC WITH DIFFERENTIAL - Abnormal    WBC 10.65      RBC 4.32      HGB 13.5      HCT 39.8      MCV 92      MCH 31.3 (*)     MCHC 33.9      RDW 14.0      Platelet Count 146 (*)     MPV 9.6      Nucleated RBC 0      Neut % 69.8      Lymph % 20.6      Mono % 6.3      Eos % 2.2      Basophil % 0.3      Imm Grans % 0.8 (*)     Neut # 7.45      Lymph # 2.19      Mono # 0.67      Eos # 0.23      Baso # 0.03      Imm Grans # 0.08 (*)    LIPASE - Normal    Lipase Level 30     MAGNESIUM - Normal    Magnesium  2.0     TROPONIN I - Normal    Troponin-I <0.006     URINALYSIS, REFLEX TO URINE CULTURE - Normal    Color, UA Yellow      Appearance, UA Clear      pH, UA 6.0      Spec Grav UA 1.025      Protein, UA Negative      Glucose, UA Negative      Ketones, UA Negative      Bilirubin, UA  Negative      Blood, UA Negative      Nitrites, UA Negative      Urobilinogen, UA Negative      Leukocyte Esterase, UA Negative     CBC W/ AUTO DIFFERENTIAL    Narrative:     The following orders were created for panel order CBC auto differential.  Procedure                               Abnormality         Status                     ---------                               -----------         ------                     CBC with Differential[[id number]]       Abnormal            Final result                 Please view results for these tests on the individual orders.   RPR(FOR MONITORING)   GREY TOP URINE HOLD   SARS-COV-2 RDRP GENE    POC Rapid COVID Negative       Acceptable Yes     POCT INFLUENZA A/B MOLECULAR    POC Molecular Influenza A Ag Negative      POC Molecular Influenza B Ag Negative       Acceptable Yes       EKG Readings: (Independently Interpreted)   22:16: NSR, HR 82. Normal axis. No ectopy. No STEMI.        Imaging Results              CT Abdomen Pelvis With IV Contrast NO Oral Contrast (Final result)  Result time 06/29/25 01:19:31      Final result by Feli Staley MD (06/29/25 01:19:31)                   Impression:      1. No acute intra-abdominal abnormalities identified.  2. Splenomegaly with stable 3 cm indeterminate splenic lesion.  3. Postsurgical changes and additional stable findings as detailed above.      Electronically signed by: Feli Staley MD  Date:    06/29/2025  Time:    01:19               Narrative:    EXAMINATION:  CT ABDOMEN PELVIS WITH IV CONTRAST    CLINICAL HISTORY:  LUQ pain;    TECHNIQUE:  Low dose axial images, sagittal and coronal reformations were obtained from the lung bases to the pubic symphysis following the IV administration of 75 mL of Omnipaque 350 .  Oral contrast was not given.    COMPARISON:  CT abdomen and pelvis 02/18/2025. CTA chest July 2024.    FINDINGS:  The visualized portion of the heart is unremarkable.  Two stable  right-sided pulmonary nodules are seen.  Mild scarring is seen at the right lung base.  Lung bases show no consolidation or pleural effusion.    No significant hepatic abnormalities are identified.  There is no intra-or extrahepatic biliary ductal dilatation.  Gallbladder is contracted.  Spleen is enlarged measuring 16 cm stable 3 cm indeterminate hypoattenuating splenic lesion seen anteriorly.  Stomach, pancreas, and adrenal glands are unremarkable.    Kidneys enhance normally with no evidence of hydronephrosis.  There is suspected bilateral renal cortical scarring.  Small right renal hypodensity, probable small cyst is seen.  No abnormalities are seen along the ureteral courses.  Urinary bladder is nondistended.  Uterus has been removed.    Appendix is visualized and is unremarkable.  The visualized loops of small and large bowel show no evidence of obstruction or inflammation.  No free air or free fluid.    Aorta tapers normally with mild atherosclerosis.    No acute osseous abnormality identified. Subcutaneous soft tissues show no significant abnormalities.                                       X-Ray Chest AP Portable (Final result)  Result time 06/29/25 00:11:37      Final result by Feli Staley MD (06/29/25 00:11:37)                   Impression:      No acute cardiopulmonary process identified.      Electronically signed by: Feli Staley MD  Date:    06/29/2025  Time:    00:11               Narrative:    EXAMINATION:  XR CHEST AP PORTABLE    CLINICAL HISTORY:  Cough, unspecified    TECHNIQUE:  Single frontal view of the chest was performed.    COMPARISON:  July 2024.    FINDINGS:  Cardiac silhouette is normal in size.  Lungs are symmetrically expanded.  No evidence of focal consolidative process, pneumothorax, or significant pleural effusion.  No acute osseous abnormality identified.                                       Medications   ketorolac injection 30 mg (30 mg Intravenous Given 6/29/25 0041)  "  iohexoL (OMNIPAQUE 350) injection 75 mL (75 mLs Intravenous Given 6/29/25 0045)     Medical Decision Making  60 y.o. female with LUQ pain. History of enlarged spleen.    Ddx includes GERD, PUD, splenic laceration, other.    EKG no STEMI.    CXR NAD.    Flu and COVID-19 negative.    Labs reassuring.      CT abdom/pelvis: "Spleen is enlarged measuring 16 cm stable 3 cm indeterminate hypoattenuating splenic lesion seen anteriorly."  This is all c/w prior.    Patient driving so I ordered IV ketorolac 30mg for pain.    I discussed negative workup with patient.  I suspect abdominal wall pain / abdominal muscle pain from URI with significant coughing.  I have refilled patient's albuterol MDI and rx'ed promethazine-DM for cough.  I have rx'ed cetirizine for congestion.  I have rx'ed zpack in case of bacterial etiology.  I have rx'ed zofran for nausea.    Patient d/c'ed, VSS.       Amount and/or Complexity of Data Reviewed  Labs: ordered.  Radiology: ordered.  ECG/medicine tests: ordered.    Risk  OTC drugs.  Prescription drug management.                                      Clinical Impression:  Final diagnoses:  [R10.10] Upper abdominal pain (Primary)  [R05.9] Cough  [R11.2] Nausea and vomiting, unspecified vomiting type  [R09.81] Sinus congestion  [R51.9] Acute nonintractable headache, unspecified headache type  [J20.9] Acute bronchitis, unspecified organism  [R16.1] Splenomegaly  [R10.9] Abdominal wall pain          ED Disposition Condition    Discharge Stable          ED Prescriptions       Medication Sig Dispense Start Date End Date Auth. Provider    albuterol (VENTOLIN HFA) 90 mcg/actuation inhaler Inhale 2 puffs into the lungs every 4 (four) hours as needed for Shortness of Breath or Wheezing. 18 g 6/29/2025 -- Yolette Matamoros MD    cetirizine (ZYRTEC) 10 MG tablet Take 1 tablet (10 mg total) by mouth nightly as needed for Allergies or Rhinitis (congestion). 30 tablet 6/29/2025 -- Yolette Matamoros MD    " azithromycin (Z-SALOME) 250 MG tablet Take 2 tablets on first day, then 1 tablet a day thereafter until all tablets are done. 6 tablet 6/29/2025 -- Yolette Matamoros MD    ondansetron (ZOFRAN-ODT) 4 MG TbDL Take 1 tablet (4 mg total) by mouth every 4 (four) hours as needed (nausea/vomiting). 20 tablet 6/29/2025 -- Yolette Matamoros MD    promethazine-dextromethorphan (PROMETHAZINE-DM) 6.25-15 mg/5 mL Syrp Take 5 mLs by mouth every 4 (four) hours as needed (cough). 118 mL 6/29/2025 7/9/2025 Yolette Matamoros MD          Follow-up Information       Follow up With Specialties Details Why Contact Info    Trevor Nolen MD Family Medicine  As needed [address] [facility]  [place] [address]  [phone]                     [1]   Social History  Tobacco Use    Smoking status: Every Day     Current packs/day: 1.50     Average packs/day: 1.5 packs/day for 8.0 years (12.0 ttl pk-yrs)     Types: Cigarettes     Passive exposure: Current    Smokeless tobacco: Current   Substance Use Topics    Alcohol use: No     Alcohol/week: 0.0 standard drinks of alcohol    Drug use: No        Yolette Matamoros MD  06/29/25 2764

## 2025-06-29 NOTE — ED TRIAGE NOTES
"Pt states she has a hx of enlarged liver and spleen. A couple of days ago her doctor "pressed very hard into my L side and its been hurting ever since so much to the point where it hurts to breath. I also have been throwing up a lot recently". Attempted to take tylenol at 1800 but symptoms remain present. Denies irregular stool patterns, CP, numbness/tingling. HOB elevated, SR up x 2, call light within reach  "

## 2025-06-30 LAB
OHS QRS DURATION: 78 MS
OHS QTC CALCULATION: 450 MS
RPR SER QL: REACTIVE
RPR SER-TITR: ABNORMAL {TITER}

## 2025-07-02 ENCOUNTER — RESULTS FOLLOW-UP (OUTPATIENT)
Dept: EMERGENCY MEDICINE | Facility: HOSPITAL | Age: 60
End: 2025-07-02

## 2025-07-03 ENCOUNTER — OFFICE VISIT (OUTPATIENT)
Dept: HEMATOLOGY/ONCOLOGY | Facility: CLINIC | Age: 60
End: 2025-07-03
Payer: MEDICAID

## 2025-07-03 VITALS
SYSTOLIC BLOOD PRESSURE: 137 MMHG | OXYGEN SATURATION: 97 % | HEIGHT: 66 IN | TEMPERATURE: 98 F | HEART RATE: 101 BPM | BODY MASS INDEX: 24.91 KG/M2 | DIASTOLIC BLOOD PRESSURE: 76 MMHG | WEIGHT: 155 LBS

## 2025-07-03 DIAGNOSIS — K76.0 FATTY LIVER: ICD-10-CM

## 2025-07-03 DIAGNOSIS — R16.2 HEPATOSPLENOMEGALY: ICD-10-CM

## 2025-07-03 DIAGNOSIS — R16.1 SPLENOMEGALY: Primary | ICD-10-CM

## 2025-07-03 DIAGNOSIS — R93.5 ABNORMAL FINDINGS ON DIAGNOSTIC IMAGING OF OTHER ABDOMINAL REGIONS, INCLUDING RETROPERITONEUM: ICD-10-CM

## 2025-07-03 PROCEDURE — 99999 PR PBB SHADOW E&M-EST. PATIENT-LVL III: CPT | Mod: PBBFAC,,, | Performed by: STUDENT IN AN ORGANIZED HEALTH CARE EDUCATION/TRAINING PROGRAM

## 2025-07-03 PROCEDURE — 3075F SYST BP GE 130 - 139MM HG: CPT | Mod: CPTII,,, | Performed by: STUDENT IN AN ORGANIZED HEALTH CARE EDUCATION/TRAINING PROGRAM

## 2025-07-03 PROCEDURE — 99213 OFFICE O/P EST LOW 20 MIN: CPT | Mod: PBBFAC | Performed by: STUDENT IN AN ORGANIZED HEALTH CARE EDUCATION/TRAINING PROGRAM

## 2025-07-03 PROCEDURE — 3008F BODY MASS INDEX DOCD: CPT | Mod: CPTII,,, | Performed by: STUDENT IN AN ORGANIZED HEALTH CARE EDUCATION/TRAINING PROGRAM

## 2025-07-03 PROCEDURE — 3078F DIAST BP <80 MM HG: CPT | Mod: CPTII,,, | Performed by: STUDENT IN AN ORGANIZED HEALTH CARE EDUCATION/TRAINING PROGRAM

## 2025-07-03 PROCEDURE — 99215 OFFICE O/P EST HI 40 MIN: CPT | Mod: S$PBB,,, | Performed by: STUDENT IN AN ORGANIZED HEALTH CARE EDUCATION/TRAINING PROGRAM

## 2025-07-03 NOTE — PROGRESS NOTES
Hematology- Oncology Clinic Note :     7/3/2025    Chief Complaint   Patient presents with    splenomegaly    Establish Care         HPI  Pt is a 60 y.o. female who  has a past medical history of Anxiety, Bipolar 1 disorder with moderate cassie, Depression, and Hypertension.  Who presents to establish care for splenomegaly/abnormal imaging that was seen on ED visit for rash.  Extensive testing in ED concerning for syphilis and pt placed on antibiotics.        Interval hx:    Since DC rash has resolved and no issues at time of exam apart from mild abdominal pain.  Spleen and liver stable in size but still enlarged.  Will get liver fibroscan and repeat US spleen in future and if not shrinking post antibiotics or fibroscan unremarkable can consider flow and bmbx if spleen still enlarged.  Pt agreeable to plan and all questions answered to her satisfaction.          Active Problem List with Overview Notes    Diagnosis Date Noted    Chest tightness     Wound dehiscence 07/28/2015       Patient Active Problem List    Diagnosis Date Noted    Chest tightness     Wound dehiscence 07/28/2015     Past Medical History:   Diagnosis Date    Anxiety     Bipolar 1 disorder with moderate cassie     Depression     Hypertension       Past Surgical History:   Procedure Laterality Date    HYSTERECTOMY      KNEE SURGERY        Prescriptions Prior to Admission[1]  Review of patient's allergies indicates:   Allergen Reactions    Penicillins Swelling      Social History     Tobacco Use    Smoking status: Every Day     Current packs/day: 1.50     Average packs/day: 1.5 packs/day for 8.0 years (12.0 ttl pk-yrs)     Types: Cigarettes     Passive exposure: Current    Smokeless tobacco: Current   Substance Use Topics    Alcohol use: No     Alcohol/week: 0.0 standard drinks of alcohol      No family history on file.     Review of Systems :  Review of Systems   Constitutional:  Negative for fever, malaise/fatigue and weight loss.   HENT:  Negative  for congestion and nosebleeds.    Eyes:  Negative for blurred vision and discharge.   Respiratory:  Negative for cough and shortness of breath.    Cardiovascular:  Negative for chest pain and leg swelling.   Gastrointestinal:  Positive for abdominal pain. Negative for blood in stool, heartburn, nausea and vomiting.   Genitourinary:  Negative for dysuria.   Musculoskeletal:  Negative for joint pain and myalgias.   Skin:  Negative for itching and rash.   Neurological:  Negative for dizziness.   Endo/Heme/Allergies:  Does not bruise/bleed easily.   Psychiatric/Behavioral:  Negative for depression. The patient is nervous/anxious.        Physical Exam :  Wt Readings from Last 3 Encounters:   07/03/25 70.3 kg (155 lb)   06/28/25 72.6 kg (160 lb)   03/03/25 66.4 kg (146 lb 6.2 oz)     Temp Readings from Last 3 Encounters:   07/03/25 98.1 °F (36.7 °C) (Oral)   06/28/25 98 °F (36.7 °C)   03/03/25 97.8 °F (36.6 °C)     BP Readings from Last 3 Encounters:   07/03/25 137/76   06/29/25 122/62   03/03/25 (!) 149/72     Pulse Readings from Last 3 Encounters:   07/03/25 101   06/29/25 93   03/03/25 88     Body mass index is 25.02 kg/m².    Physical Exam  Vitals reviewed.   HENT:      Head: Normocephalic and atraumatic.      Nose: Nose normal.      Mouth/Throat:      Mouth: Mucous membranes are moist.   Eyes:      Pupils: Pupils are equal, round, and reactive to light.   Cardiovascular:      Rate and Rhythm: Normal rate and regular rhythm.   Pulmonary:      Effort: Pulmonary effort is normal.      Breath sounds: Normal breath sounds.   Abdominal:      General: Abdomen is flat.   Musculoskeletal:         General: Normal range of motion.      Cervical back: Normal range of motion and neck supple.   Skin:     General: Skin is warm and dry.      Findings: No rash.   Neurological:      Mental Status: She is alert. Mental status is at baseline.   Psychiatric:      Comments: Pressured speech            Pertinent Diagnostic studies:    No  results found for this or any previous visit (from the past 24 hours).    Assessment/Plan :     Hepatosplenomegaly  -Lesion in the spleen remains indeterminate. Imaging features not typical of a hemangioma or splenic hamartoma. Fibrous lesion could potentially give this MRI appearance though these are not typically hyperechoic on ultrasound. Lesion can be followed with ultrasound to ensure size stability as seen on US and MRI in ED  -Most likely secondary to syphilis infection, possible fatty liver  -Spleen and liver size largely stable, will continue to monitor  -RTC in 9 weWill get liver fibroscan and repeat US spleen in future and if not shrinking post antibiotics or fibroscan unremarkable can consider flow and bmbx if spleen still enlargedeks for MD visit with Dr. Palafox and liver fibroscan, CBC and repeat spleen US  1 week prior      Syphilis infection  -Has ID referral and completing antibiotics        Time spent on case: 40 minutes    Summary of orders placed this encounter:  Orders Placed This Encounter   Procedures    FibroScan (Vibration Controlled Transient Elastography)    US Abdomen Limited_Spleen    CBC Auto Differential       Future Appointments   Date Time Provider Department Center   7/3/2025  1:40 PM Usman Stewart MD [facility] HEM ONC [facility]           Usman Stewart MD   Hematology/oncology, [facility] [place]             [1] (Not in a hospital admission)

## 2025-07-03 NOTE — Clinical Note
-RTC in 9 weeks for MD visit with Dr. Palafox and liver fibroscan, CBC and repeat spleen US S 1 week prior

## 2025-07-18 ENCOUNTER — TELEPHONE (OUTPATIENT)
Dept: HEMATOLOGY/ONCOLOGY | Facility: CLINIC | Age: 60
End: 2025-07-18
Payer: MEDICAID

## 2025-07-18 NOTE — TELEPHONE ENCOUNTER
Copied from CRM #[id number]. Topic: General Inquiry - Patient Advice  >> Jul 18, 2025 12:15 PM Dylan wrote:  Consult/Advisory     Name Of Caller: Allison Dietz        Contact Preference: [phone]     Nature of call: Pt is calling because she states she can't seem to get into the neck therapy at [facility] because she can't get in touch with them to schedule. States when she calls she has to leave a message and no one gets back with her.

## 2025-07-25 ENCOUNTER — TELEPHONE (OUTPATIENT)
Dept: HEPATOLOGY | Facility: CLINIC | Age: 60
End: 2025-07-25
Payer: MEDICAID

## 2025-07-25 NOTE — TELEPHONE ENCOUNTER
----- Message from Med Assistant Fernandez sent at 7/24/2025  9:44 AM CDT -----  Good morning,     Can you please assist em in scheduling a fibroscan for this patient?  ----- Message -----  From: Rebecca Hobson MA  Sent: 7/3/2025   1:49 PM CDT  To: Rebecca Hobson MA      ----- Message -----  From: Usman Stewart MD  Sent: 7/3/2025   1:37 PM CDT  To: [facility] Hem Onc Clinical Staff    -RTC in 9 weeks for MD visit with Dr. Palafox and liver fibroscan, CBC and repeat spleen US S 1 week prior

## 2025-07-25 NOTE — TELEPHONE ENCOUNTER
Called the patient to schedule a fibroscan.  No answer, left message with call back # [phone].

## 2025-08-13 ENCOUNTER — TELEPHONE (OUTPATIENT)
Dept: HEPATOLOGY | Facility: CLINIC | Age: 60
End: 2025-08-13
Payer: MEDICAID

## 2025-08-25 ENCOUNTER — PROCEDURE VISIT (OUTPATIENT)
Dept: HEPATOLOGY | Facility: CLINIC | Age: 60
End: 2025-08-25
Payer: MEDICAID

## 2025-08-25 DIAGNOSIS — R16.2 HEPATOSPLENOMEGALY: ICD-10-CM

## 2025-08-25 DIAGNOSIS — K76.0 FATTY LIVER: ICD-10-CM

## 2025-08-25 PROCEDURE — 91200 LIVER ELASTOGRAPHY: CPT | Mod: PBBFAC | Performed by: PHYSICIAN ASSISTANT

## 2025-09-02 ENCOUNTER — LAB VISIT (OUTPATIENT)
Dept: LAB | Facility: HOSPITAL | Age: 60
End: 2025-09-02
Attending: INTERNAL MEDICINE
Payer: MEDICAID

## 2025-09-02 DIAGNOSIS — R16.1 SPLENOMEGALY: ICD-10-CM

## 2025-09-02 DIAGNOSIS — R16.2 HEPATOSPLENOMEGALY: ICD-10-CM

## 2025-09-02 LAB
ABSOLUTE EOSINOPHIL (OHS): 0.15 K/UL
ABSOLUTE MONOCYTE (OHS): 0.42 K/UL (ref 0.3–1)
ABSOLUTE NEUTROPHIL COUNT (OHS): 6.43 K/UL (ref 1.8–7.7)
BASOPHILS # BLD AUTO: 0.03 K/UL
BASOPHILS NFR BLD AUTO: 0.3 %
ERYTHROCYTE [DISTWIDTH] IN BLOOD BY AUTOMATED COUNT: 13.2 % (ref 11.5–14.5)
HCT VFR BLD AUTO: 39.1 % (ref 37–48.5)
HGB BLD-MCNC: 13.7 GM/DL (ref 12–16)
IMM GRANULOCYTES # BLD AUTO: 0.04 K/UL (ref 0–0.04)
IMM GRANULOCYTES NFR BLD AUTO: 0.4 % (ref 0–0.5)
LYMPHOCYTES # BLD AUTO: 2 K/UL (ref 1–4.8)
MCH RBC QN AUTO: 31.6 PG (ref 27–31)
MCHC RBC AUTO-ENTMCNC: 35 G/DL (ref 32–36)
MCV RBC AUTO: 90 FL (ref 82–98)
NUCLEATED RBC (/100WBC) (OHS): 0 /100 WBC
PLATELET # BLD AUTO: 141 K/UL (ref 150–450)
PMV BLD AUTO: 9.8 FL (ref 9.2–12.9)
RBC # BLD AUTO: 4.33 M/UL (ref 4–5.4)
RELATIVE EOSINOPHIL (OHS): 1.7 %
RELATIVE LYMPHOCYTE (OHS): 22.1 % (ref 18–48)
RELATIVE MONOCYTE (OHS): 4.6 % (ref 4–15)
RELATIVE NEUTROPHIL (OHS): 70.9 % (ref 38–73)
WBC # BLD AUTO: 9.07 K/UL (ref 3.9–12.7)

## 2025-09-02 PROCEDURE — 36415 COLL VENOUS BLD VENIPUNCTURE: CPT

## 2025-09-02 PROCEDURE — 85025 COMPLETE CBC W/AUTO DIFF WBC: CPT
